# Patient Record
Sex: FEMALE | Race: BLACK OR AFRICAN AMERICAN | NOT HISPANIC OR LATINO | Employment: OTHER | URBAN - METROPOLITAN AREA
[De-identification: names, ages, dates, MRNs, and addresses within clinical notes are randomized per-mention and may not be internally consistent; named-entity substitution may affect disease eponyms.]

---

## 2017-09-28 DIAGNOSIS — Z12.31 ENCOUNTER FOR SCREENING MAMMOGRAM FOR MALIGNANT NEOPLASM OF BREAST: Primary | ICD-10-CM

## 2017-10-26 ENCOUNTER — HOSPITAL ENCOUNTER (OUTPATIENT)
Dept: RADIOLOGY | Facility: CLINIC | Age: 77
Discharge: HOME | End: 2017-10-26
Attending: OBSTETRICS & GYNECOLOGY
Payer: MEDICARE

## 2017-10-26 DIAGNOSIS — Z12.31 ENCOUNTER FOR SCREENING MAMMOGRAM FOR MALIGNANT NEOPLASM OF BREAST: ICD-10-CM

## 2017-10-26 PROCEDURE — 77067 SCR MAMMO BI INCL CAD: CPT | Mod: TC

## 2017-10-26 PROCEDURE — 77067 SCR MAMMO BI INCL CAD: CPT | Mod: 26,,, | Performed by: RADIOLOGY

## 2017-10-26 PROCEDURE — 77063 BREAST TOMOSYNTHESIS BI: CPT | Mod: 26,,, | Performed by: RADIOLOGY

## 2017-12-31 LAB — BNP SERPL-MCNC: 229 PG/ML (ref 0–100)

## 2018-06-06 ENCOUNTER — HISTORICAL (OUTPATIENT)
Dept: ADMINISTRATIVE | Facility: HOSPITAL | Age: 78
End: 2018-06-06

## 2018-09-11 ENCOUNTER — HOSPITAL ENCOUNTER (INPATIENT)
Facility: HOSPITAL | Age: 78
LOS: 13 days | Discharge: NURSING, REHAB OR LONG-TERM CARE | DRG: 871 | End: 2018-09-24
Attending: EMERGENCY MEDICINE | Admitting: INTERNAL MEDICINE
Payer: MEDICARE

## 2018-09-11 DIAGNOSIS — E03.9 ACQUIRED HYPOTHYROIDISM: ICD-10-CM

## 2018-09-11 DIAGNOSIS — R31.9 HEMATURIA, UNSPECIFIED TYPE: ICD-10-CM

## 2018-09-11 DIAGNOSIS — A41.9 SEVERE SEPSIS: ICD-10-CM

## 2018-09-11 DIAGNOSIS — N39.0 ACUTE UTI: Primary | ICD-10-CM

## 2018-09-11 DIAGNOSIS — N30.01 ACUTE CYSTITIS WITH HEMATURIA: ICD-10-CM

## 2018-09-11 DIAGNOSIS — G93.41 ENCEPHALOPATHY, METABOLIC: ICD-10-CM

## 2018-09-11 DIAGNOSIS — Z86.711 HISTORY OF PULMONARY EMBOLISM: ICD-10-CM

## 2018-09-11 DIAGNOSIS — I10 ESSENTIAL HYPERTENSION: ICD-10-CM

## 2018-09-11 DIAGNOSIS — R65.20 SEVERE SEPSIS: ICD-10-CM

## 2018-09-11 DIAGNOSIS — R00.1 BRADYCARDIA: ICD-10-CM

## 2018-09-11 DIAGNOSIS — N18.2 STAGE 2 CHRONIC KIDNEY DISEASE: ICD-10-CM

## 2018-09-11 DIAGNOSIS — R41.82 ALTERED MENTAL STATUS, UNSPECIFIED ALTERED MENTAL STATUS TYPE: ICD-10-CM

## 2018-09-11 DIAGNOSIS — I50.32 CHRONIC DIASTOLIC HEART FAILURE: ICD-10-CM

## 2018-09-11 LAB
ALBUMIN SERPL BCP-MCNC: 2.8 G/DL
ALP SERPL-CCNC: 150 U/L
ALT SERPL W/O P-5'-P-CCNC: 25 U/L
ANION GAP SERPL CALC-SCNC: 17 MMOL/L
ANISOCYTOSIS BLD QL SMEAR: ABNORMAL
AST SERPL-CCNC: 51 U/L
BACTERIA #/AREA URNS HPF: ABNORMAL /HPF
BASOPHILS # BLD AUTO: ABNORMAL K/UL
BASOPHILS NFR BLD: 0 %
BILIRUB SERPL-MCNC: 2.4 MG/DL
BILIRUB UR QL STRIP: ABNORMAL
BUN SERPL-MCNC: 43 MG/DL
CALCIUM SERPL-MCNC: 9.8 MG/DL
CHLORIDE SERPL-SCNC: 94 MMOL/L
CLARITY UR: ABNORMAL
CO2 SERPL-SCNC: 27 MMOL/L
COLOR UR: ABNORMAL
CREAT SERPL-MCNC: 1 MG/DL
DIFFERENTIAL METHOD: ABNORMAL
EOSINOPHIL # BLD AUTO: ABNORMAL K/UL
EOSINOPHIL NFR BLD: 0 %
ERYTHROCYTE [DISTWIDTH] IN BLOOD BY AUTOMATED COUNT: 23.7 %
EST. GFR  (AFRICAN AMERICAN): >60 ML/MIN/1.73 M^2
EST. GFR  (NON AFRICAN AMERICAN): 54 ML/MIN/1.73 M^2
GLUCOSE SERPL-MCNC: 87 MG/DL
GLUCOSE UR QL STRIP: NEGATIVE
HCT VFR BLD AUTO: 34.8 %
HGB BLD-MCNC: 10.1 G/DL
HGB UR QL STRIP: ABNORMAL
HYALINE CASTS #/AREA URNS LPF: 0 /LPF
KETONES UR QL STRIP: NEGATIVE
LACTATE SERPL-SCNC: 4.9 MMOL/L
LEUKOCYTE ESTERASE UR QL STRIP: ABNORMAL
LYMPHOCYTES # BLD AUTO: ABNORMAL K/UL
LYMPHOCYTES NFR BLD: 15 %
MCH RBC QN AUTO: 21.8 PG
MCHC RBC AUTO-ENTMCNC: 29.2 G/DL
MCV RBC AUTO: 75 FL
MICROSCOPIC COMMENT: ABNORMAL
MONOCYTES # BLD AUTO: ABNORMAL K/UL
MONOCYTES NFR BLD: 9 %
NEUTROPHILS NFR BLD: 73 %
NEUTS BAND NFR BLD MANUAL: 3 %
NITRITE UR QL STRIP: NEGATIVE
NRBC BLD-RTO: 0 /100 WBC
OVALOCYTES BLD QL SMEAR: ABNORMAL
PH UR STRIP: 6 [PH] (ref 5–8)
PLATELET # BLD AUTO: 407 K/UL
PLATELET BLD QL SMEAR: ABNORMAL
PMV BLD AUTO: 9.4 FL
POCT GLUCOSE: 87 MG/DL (ref 70–110)
POIKILOCYTOSIS BLD QL SMEAR: SLIGHT
POLYCHROMASIA BLD QL SMEAR: ABNORMAL
POTASSIUM SERPL-SCNC: 5 MMOL/L
PROT SERPL-MCNC: 7.8 G/DL
PROT UR QL STRIP: ABNORMAL
RBC # BLD AUTO: 4.65 M/UL
RBC #/AREA URNS HPF: >100 /HPF (ref 0–4)
SODIUM SERPL-SCNC: 138 MMOL/L
SP GR UR STRIP: 1.02 (ref 1–1.03)
SQUAMOUS #/AREA URNS HPF: 1 /HPF
TARGETS BLD QL SMEAR: ABNORMAL
URN SPEC COLLECT METH UR: ABNORMAL
UROBILINOGEN UR STRIP-ACNC: 1 EU/DL
WBC # BLD AUTO: 12.5 K/UL
WBC #/AREA URNS HPF: >100 /HPF (ref 0–5)

## 2018-09-11 PROCEDURE — 63600175 PHARM REV CODE 636 W HCPCS: Performed by: EMERGENCY MEDICINE

## 2018-09-11 PROCEDURE — 87086 URINE CULTURE/COLONY COUNT: CPT

## 2018-09-11 PROCEDURE — 85610 PROTHROMBIN TIME: CPT

## 2018-09-11 PROCEDURE — 87077 CULTURE AEROBIC IDENTIFY: CPT

## 2018-09-11 PROCEDURE — 87088 URINE BACTERIA CULTURE: CPT

## 2018-09-11 PROCEDURE — 81000 URINALYSIS NONAUTO W/SCOPE: CPT

## 2018-09-11 PROCEDURE — 12000002 HC ACUTE/MED SURGE SEMI-PRIVATE ROOM

## 2018-09-11 PROCEDURE — 93005 ELECTROCARDIOGRAM TRACING: CPT

## 2018-09-11 PROCEDURE — P9612 CATHETERIZE FOR URINE SPEC: HCPCS

## 2018-09-11 PROCEDURE — 25000003 PHARM REV CODE 250: Performed by: EMERGENCY MEDICINE

## 2018-09-11 PROCEDURE — 85730 THROMBOPLASTIN TIME PARTIAL: CPT

## 2018-09-11 PROCEDURE — 83605 ASSAY OF LACTIC ACID: CPT

## 2018-09-11 PROCEDURE — 80053 COMPREHEN METABOLIC PANEL: CPT

## 2018-09-11 PROCEDURE — 87040 BLOOD CULTURE FOR BACTERIA: CPT | Mod: 59

## 2018-09-11 PROCEDURE — 87186 SC STD MICRODIL/AGAR DIL: CPT

## 2018-09-11 PROCEDURE — 85007 BL SMEAR W/DIFF WBC COUNT: CPT

## 2018-09-11 PROCEDURE — 85027 COMPLETE CBC AUTOMATED: CPT

## 2018-09-11 PROCEDURE — 82962 GLUCOSE BLOOD TEST: CPT

## 2018-09-11 PROCEDURE — 99285 EMERGENCY DEPT VISIT HI MDM: CPT

## 2018-09-11 PROCEDURE — 36415 COLL VENOUS BLD VENIPUNCTURE: CPT

## 2018-09-11 RX ORDER — IPRATROPIUM BROMIDE AND ALBUTEROL SULFATE 2.5; .5 MG/3ML; MG/3ML
3 SOLUTION RESPIRATORY (INHALATION) EVERY 6 HOURS PRN
COMMUNITY

## 2018-09-11 RX ORDER — TRAMADOL HYDROCHLORIDE 50 MG/1
50 TABLET ORAL EVERY 6 HOURS PRN
COMMUNITY

## 2018-09-11 RX ORDER — ASPIRIN 81 MG/1
81 TABLET ORAL DAILY
COMMUNITY

## 2018-09-11 RX ORDER — LATANOPROST 50 UG/ML
2 SOLUTION/ DROPS OPHTHALMIC 2 TIMES DAILY
COMMUNITY

## 2018-09-11 RX ORDER — ACETAMINOPHEN 325 MG/1
650 TABLET ORAL EVERY 6 HOURS PRN
COMMUNITY

## 2018-09-11 RX ORDER — DIGOXIN 250 MCG
250 TABLET ORAL DAILY
Status: ON HOLD | COMMUNITY
End: 2018-09-24 | Stop reason: HOSPADM

## 2018-09-11 RX ORDER — FUROSEMIDE 20 MG/1
60 TABLET ORAL 2 TIMES DAILY
Status: ON HOLD | COMMUNITY
End: 2018-09-24 | Stop reason: SDUPTHER

## 2018-09-11 RX ORDER — LEVOTHYROXINE SODIUM 25 UG/1
25 TABLET ORAL
COMMUNITY

## 2018-09-11 RX ORDER — DOXYCYCLINE 100 MG/1
100 CAPSULE ORAL EVERY 12 HOURS
Status: ON HOLD | COMMUNITY
Start: 2018-09-06 | End: 2018-09-24 | Stop reason: HOSPADM

## 2018-09-11 RX ADMIN — SODIUM CHLORIDE 3267 ML: 0.9 INJECTION, SOLUTION INTRAVENOUS at 10:09

## 2018-09-11 RX ADMIN — GENTAMICIN SULFATE 240 MG: 40 INJECTION, SOLUTION INTRAMUSCULAR; INTRAVENOUS at 10:09

## 2018-09-12 PROBLEM — R65.20 SEVERE SEPSIS: Status: ACTIVE | Noted: 2018-09-12

## 2018-09-12 PROBLEM — H40.9 GLAUCOMA: Status: ACTIVE | Noted: 2018-09-12

## 2018-09-12 PROBLEM — E03.9 ACQUIRED HYPOTHYROIDISM: Status: ACTIVE | Noted: 2018-09-12

## 2018-09-12 PROBLEM — N30.01 ACUTE CYSTITIS WITH HEMATURIA: Status: ACTIVE | Noted: 2018-09-12

## 2018-09-12 PROBLEM — I10 ESSENTIAL HYPERTENSION: Status: ACTIVE | Noted: 2018-09-12

## 2018-09-12 PROBLEM — N18.2 STAGE 2 CHRONIC KIDNEY DISEASE: Status: ACTIVE | Noted: 2018-09-12

## 2018-09-12 PROBLEM — I50.32 CHRONIC DIASTOLIC HEART FAILURE: Status: ACTIVE | Noted: 2018-09-12

## 2018-09-12 PROBLEM — Z86.711 HISTORY OF PULMONARY EMBOLISM: Status: ACTIVE | Noted: 2018-09-12

## 2018-09-12 PROBLEM — A41.9 SEVERE SEPSIS: Status: ACTIVE | Noted: 2018-09-12

## 2018-09-12 PROBLEM — G93.41 ENCEPHALOPATHY, METABOLIC: Status: ACTIVE | Noted: 2018-09-12

## 2018-09-12 LAB
ANION GAP SERPL CALC-SCNC: 15 MMOL/L
ANISOCYTOSIS BLD QL SMEAR: SLIGHT
APTT BLDCRRT: 50.7 SEC
BASOPHILS NFR BLD: 0 %
BUN SERPL-MCNC: 43 MG/DL
CALCIUM SERPL-MCNC: 9.5 MG/DL
CHLORIDE SERPL-SCNC: 97 MMOL/L
CK MB SERPL-MCNC: 2.7 NG/ML
CK MB SERPL-MCNC: 3 NG/ML
CK MB SERPL-RTO: 6.4 %
CK MB SERPL-RTO: 6.8 %
CK SERPL-CCNC: 40 U/L
CK SERPL-CCNC: 40 U/L
CK SERPL-CCNC: 47 U/L
CK SERPL-CCNC: 47 U/L
CO2 SERPL-SCNC: 26 MMOL/L
CREAT SERPL-MCNC: 1 MG/DL
DIFFERENTIAL METHOD: ABNORMAL
DIGOXIN SERPL-MCNC: 1.8 NG/ML
EOSINOPHIL NFR BLD: 0 %
ERYTHROCYTE [DISTWIDTH] IN BLOOD BY AUTOMATED COUNT: 23.4 %
EST. GFR  (AFRICAN AMERICAN): >60 ML/MIN/1.73 M^2
EST. GFR  (NON AFRICAN AMERICAN): 54 ML/MIN/1.73 M^2
GLUCOSE SERPL-MCNC: 76 MG/DL
HCT VFR BLD AUTO: 34.7 %
HGB BLD-MCNC: 10 G/DL
INR PPP: 2.3
INR PPP: 2.4
LACTATE SERPL-SCNC: 3.9 MMOL/L
LACTATE SERPL-SCNC: 3.9 MMOL/L
LACTATE SERPL-SCNC: 4.2 MMOL/L
LACTATE SERPL-SCNC: 4.6 MMOL/L
LYMPHOCYTES NFR BLD: 7 %
MAGNESIUM SERPL-MCNC: 1.6 MG/DL
MCH RBC QN AUTO: 21.6 PG
MCHC RBC AUTO-ENTMCNC: 28.8 G/DL
MCV RBC AUTO: 75 FL
MONOCYTES NFR BLD: 8 %
NEUTROPHILS NFR BLD: 85 %
NRBC BLD-RTO: 1 /100 WBC
PHOSPHATE SERPL-MCNC: 3.8 MG/DL
PLATELET # BLD AUTO: 327 K/UL
PLATELET BLD QL SMEAR: ABNORMAL
PMV BLD AUTO: 9.4 FL
POCT GLUCOSE: 100 MG/DL (ref 70–110)
POCT GLUCOSE: 132 MG/DL (ref 70–110)
POCT GLUCOSE: 135 MG/DL (ref 70–110)
POCT GLUCOSE: 57 MG/DL (ref 70–110)
POCT GLUCOSE: 99 MG/DL (ref 70–110)
POLYCHROMASIA BLD QL SMEAR: ABNORMAL
POTASSIUM SERPL-SCNC: 4.8 MMOL/L
PROTHROMBIN TIME: 22.8 SEC
PROTHROMBIN TIME: 24.4 SEC
RBC # BLD AUTO: 4.62 M/UL
SODIUM SERPL-SCNC: 138 MMOL/L
TARGETS BLD QL SMEAR: ABNORMAL
TROPONIN I SERPL DL<=0.01 NG/ML-MCNC: 0.01 NG/ML
TROPONIN I SERPL DL<=0.01 NG/ML-MCNC: 0.01 NG/ML
WBC # BLD AUTO: 12.5 K/UL

## 2018-09-12 PROCEDURE — 99233 SBSQ HOSP IP/OBS HIGH 50: CPT | Mod: ,,, | Performed by: INTERNAL MEDICINE

## 2018-09-12 PROCEDURE — 88112 CYTOPATH CELL ENHANCE TECH: CPT | Mod: 26,,, | Performed by: PATHOLOGY

## 2018-09-12 PROCEDURE — 82550 ASSAY OF CK (CPK): CPT

## 2018-09-12 PROCEDURE — 88112 CYTOPATH CELL ENHANCE TECH: CPT | Performed by: PATHOLOGY

## 2018-09-12 PROCEDURE — 83735 ASSAY OF MAGNESIUM: CPT

## 2018-09-12 PROCEDURE — 84100 ASSAY OF PHOSPHORUS: CPT

## 2018-09-12 PROCEDURE — 83605 ASSAY OF LACTIC ACID: CPT | Mod: 91

## 2018-09-12 PROCEDURE — 93005 ELECTROCARDIOGRAM TRACING: CPT

## 2018-09-12 PROCEDURE — 84484 ASSAY OF TROPONIN QUANT: CPT

## 2018-09-12 PROCEDURE — 94761 N-INVAS EAR/PLS OXIMETRY MLT: CPT

## 2018-09-12 PROCEDURE — 85007 BL SMEAR W/DIFF WBC COUNT: CPT

## 2018-09-12 PROCEDURE — 83605 ASSAY OF LACTIC ACID: CPT

## 2018-09-12 PROCEDURE — 85027 COMPLETE CBC AUTOMATED: CPT

## 2018-09-12 PROCEDURE — 63600175 PHARM REV CODE 636 W HCPCS: Performed by: NURSE PRACTITIONER

## 2018-09-12 PROCEDURE — 80048 BASIC METABOLIC PNL TOTAL CA: CPT

## 2018-09-12 PROCEDURE — 51700 IRRIGATION OF BLADDER: CPT | Mod: ,,, | Performed by: UROLOGY

## 2018-09-12 PROCEDURE — 80162 ASSAY OF DIGOXIN TOTAL: CPT

## 2018-09-12 PROCEDURE — 25000003 PHARM REV CODE 250: Performed by: NURSE PRACTITIONER

## 2018-09-12 PROCEDURE — 85610 PROTHROMBIN TIME: CPT

## 2018-09-12 PROCEDURE — 27000221 HC OXYGEN, UP TO 24 HOURS

## 2018-09-12 PROCEDURE — 25000003 PHARM REV CODE 250: Performed by: HOSPITALIST

## 2018-09-12 PROCEDURE — 36415 COLL VENOUS BLD VENIPUNCTURE: CPT

## 2018-09-12 PROCEDURE — 82553 CREATINE MB FRACTION: CPT

## 2018-09-12 PROCEDURE — 25500020 PHARM REV CODE 255

## 2018-09-12 PROCEDURE — 25000003 PHARM REV CODE 250: Performed by: INTERNAL MEDICINE

## 2018-09-12 PROCEDURE — 20000000 HC ICU ROOM

## 2018-09-12 PROCEDURE — 99223 1ST HOSP IP/OBS HIGH 75: CPT | Mod: 25,,, | Performed by: UROLOGY

## 2018-09-12 RX ORDER — DIGOXIN 125 MCG
250 TABLET ORAL DAILY
Status: DISCONTINUED | OUTPATIENT
Start: 2018-09-12 | End: 2018-09-13

## 2018-09-12 RX ORDER — SODIUM CHLORIDE 9 MG/ML
INJECTION, SOLUTION INTRAVENOUS CONTINUOUS
Status: DISCONTINUED | OUTPATIENT
Start: 2018-09-12 | End: 2018-09-15

## 2018-09-12 RX ORDER — LATANOPROST 50 UG/ML
1 SOLUTION/ DROPS OPHTHALMIC NIGHTLY
Status: DISCONTINUED | OUTPATIENT
Start: 2018-09-12 | End: 2018-09-24 | Stop reason: HOSPADM

## 2018-09-12 RX ORDER — GLUCAGON 1 MG
1 KIT INJECTION
Status: DISCONTINUED | OUTPATIENT
Start: 2018-09-12 | End: 2018-09-24 | Stop reason: HOSPADM

## 2018-09-12 RX ORDER — PANTOPRAZOLE SODIUM 40 MG/1
40 TABLET, DELAYED RELEASE ORAL DAILY
Status: DISCONTINUED | OUTPATIENT
Start: 2018-09-12 | End: 2018-09-24 | Stop reason: HOSPADM

## 2018-09-12 RX ORDER — ACETAMINOPHEN 325 MG/1
650 TABLET ORAL EVERY 4 HOURS PRN
Status: DISCONTINUED | OUTPATIENT
Start: 2018-09-12 | End: 2018-09-24 | Stop reason: HOSPADM

## 2018-09-12 RX ORDER — IBUPROFEN 200 MG
16 TABLET ORAL
Status: DISCONTINUED | OUTPATIENT
Start: 2018-09-12 | End: 2018-09-24 | Stop reason: HOSPADM

## 2018-09-12 RX ORDER — SODIUM CHLORIDE 9 MG/ML
INJECTION, SOLUTION INTRAVENOUS
Status: DISPENSED
Start: 2018-09-12 | End: 2018-09-12

## 2018-09-12 RX ORDER — CEFEPIME HYDROCHLORIDE 1 G/50ML
1 INJECTION, SOLUTION INTRAVENOUS
Status: DISCONTINUED | OUTPATIENT
Start: 2018-09-12 | End: 2018-09-22

## 2018-09-12 RX ORDER — LEVOTHYROXINE SODIUM 25 UG/1
25 TABLET ORAL
Status: DISCONTINUED | OUTPATIENT
Start: 2018-09-12 | End: 2018-09-24 | Stop reason: HOSPADM

## 2018-09-12 RX ORDER — METOPROLOL SUCCINATE 50 MG/1
100 TABLET, EXTENDED RELEASE ORAL DAILY
Status: DISCONTINUED | OUTPATIENT
Start: 2018-09-12 | End: 2018-09-12

## 2018-09-12 RX ORDER — TIMOLOL MALEATE 5 MG/ML
1 SOLUTION/ DROPS OPHTHALMIC DAILY
Status: DISCONTINUED | OUTPATIENT
Start: 2018-09-12 | End: 2018-09-24 | Stop reason: HOSPADM

## 2018-09-12 RX ORDER — SODIUM CHLORIDE 0.9 % (FLUSH) 0.9 %
5 SYRINGE (ML) INJECTION
Status: DISCONTINUED | OUTPATIENT
Start: 2018-09-12 | End: 2018-09-24 | Stop reason: HOSPADM

## 2018-09-12 RX ORDER — TRAMADOL HYDROCHLORIDE 50 MG/1
50 TABLET ORAL EVERY 6 HOURS PRN
Status: DISCONTINUED | OUTPATIENT
Start: 2018-09-12 | End: 2018-09-24 | Stop reason: HOSPADM

## 2018-09-12 RX ORDER — IBUPROFEN 200 MG
24 TABLET ORAL
Status: DISCONTINUED | OUTPATIENT
Start: 2018-09-12 | End: 2018-09-24 | Stop reason: HOSPADM

## 2018-09-12 RX ADMIN — CEFEPIME HYDROCHLORIDE 1 G: 1 INJECTION, POWDER, FOR SOLUTION INTRAMUSCULAR; INTRAVENOUS at 03:09

## 2018-09-12 RX ADMIN — TIMOLOL MALEATE 1 DROP: 5 SOLUTION OPHTHALMIC at 10:09

## 2018-09-12 RX ADMIN — PANTOPRAZOLE SODIUM 40 MG: 40 TABLET, DELAYED RELEASE ORAL at 10:09

## 2018-09-12 RX ADMIN — LATANOPROST 1 DROP: 50 SOLUTION OPHTHALMIC at 08:09

## 2018-09-12 RX ADMIN — DEXTROSE MONOHYDRATE 12.5 G: 25 INJECTION, SOLUTION INTRAVENOUS at 08:09

## 2018-09-12 RX ADMIN — IOHEXOL 100 ML: 350 INJECTION, SOLUTION INTRAVENOUS at 09:09

## 2018-09-12 RX ADMIN — SODIUM CHLORIDE: 0.9 INJECTION, SOLUTION INTRAVENOUS at 08:09

## 2018-09-12 NOTE — NURSING
Pt received to unit via ED stretcher. Mckeon catheter intact with red bloody urine. Pt noted to have multiple attempted PIV placement with blood oozing from sites. Bruising all over body. Bradycardic in mid 40s-60s. Pt alert with mumbled speech. Unable to complete most of admit assessment questions at this time.

## 2018-09-12 NOTE — PROGRESS NOTES
Progress Note  Hospital Medicine  Patient Name:Marjroie Macario  MRN:  [id number]  Patient Class: IP- Inpatient  Admit Date: 9/11/2018  Length of Stay: 0 days  Expected Discharge Date:   Attending Physician: Erin Silver MD  Primary Care Provider:  Sotero Le MD    SUBJECTIVE:     Principal Problem: Severe sepsis  Initial history of present illness: Pt was sent from [facility] for hematuria and altered mental status. Pt was difficult to arouse, now after IV fluids, awake and talking unintelligible. Unable to obtain history and ROS from pt due to encephalopathy. Records did not offer any up to date information. In July, pt was treated for septic shock due to UTI at [facility]. Pt required pressors along with mechanical ventilation at that time.     PMH/PSH/SH/FH/Meds: reviewed.    Symptoms/Review of Systems: In ICU, patient is with intermittent confusion, low blood pressure, hematuria persisting. No shortness of breath, cough, chest pain or headache, fever or abdominal pain.   Diet:  Adequate intake.    Activity level: Up with assist   Pain:  Patient reports no pain.       OBJECTIVE:   Vital Signs (Most Recent):      Temp: 97.6 °F (36.4 °C) (09/12/18 0154)  Pulse: 60 (09/12/18 0345)  Resp: 17 (09/12/18 0345)  BP: 107/62 (09/12/18 0330)  SpO2: (unable to  reading) (09/12/18 0154)       Vital Signs Range (Last 24H):  Temp:  [96.8 °F (36 °C)-97.6 °F (36.4 °C)]   Pulse:  [50-63]   Resp:  [13-65]   BP: (102-127)/(51-65)   SpO2:  [67 %]     Weight: 118.9 kg (262 lb 2 oz)  Body mass index is 43.62 kg/m².    Intake/Output Summary (Last 24 hours) at 9/12/2018 0830  Last data filed at 9/12/2018 0505  Gross per 24 hour   Intake --   Output 150 ml   Net -150 ml     Physical Examination:  Constitutional: She is oriented to person, place, and time. No distress.   HENT:   Head: Normocephalic and atraumatic.   Eyes: Conjunctivae are normal. Right eye exhibits no discharge. Left eye exhibits no discharge. No scleral icterus.    Neck: Normal range of motion. Neck supple. No JVD present. No thyromegaly present.   Cardiovascular: Normal rate and regular rhythm. Exam reveals no gallop and no friction rub.   Murmur heard.  Pulmonary/Chest: Effort normal and breath sounds normal. No respiratory distress. She has no wheezes. She has no rales. She exhibits no tenderness.   Abdominal: Soft. Bowel sounds are normal. She exhibits no distension. There is no tenderness. There is no rebound and no guarding.   Musculoskeletal: She exhibits no edema or tenderness.   Lymphadenopathy:     She has no cervical adenopathy.   Neurological: She is alert and oriented to person, place, and time. No cranial nerve deficit.   Skin: Skin is dry. Capillary refill takes less than 2 seconds. She is not diaphoretic.   Cool to touch  Pretibial bandage removed-small ulcer with a clean base noted.     Psychiatric: She has a normal mood and affect. Her behavior is normal. Judgment and thought content normal.    CBC:  Recent Labs   Lab  09/11/18 2152 09/12/18 0622   WBC  12.50  12.50   RBC  4.65  4.62   HGB  10.1*  10.0*   HCT  34.8*  34.7*   PLT  407*  327   MCV  75*  75*   MCH  21.8*  21.6*   MCHC  29.2*  28.8*   BMP  Recent Labs   Lab  09/11/18 2151 09/12/18 0622   GLU  87  76   NA  138  138   K  5.0  4.8   CL  94*  97   CO2  27  26   BUN  43*  43*   CREATININE  1.0  1.0   CALCIUM  9.8  9.5   MG   --   1.6      Diagnostic Results:  Microbiology Results (last 7 days)     Procedure Component Value Units Date/Time    Blood culture x two cultures. Draw prior to antibiotics. [[id number]] Collected:  09/11/18 2151    Order Status:  Completed Specimen:  Blood Updated:  09/12/18 0715     Blood Culture, Routine No Growth to date    Narrative:       Aerobic and anaerobic    Blood culture x two cultures. Draw prior to antibiotics. [[id number]] Collected:  09/11/18 2151    Order Status:  Completed Specimen:  Blood Updated:  09/12/18 0715     Blood Culture, Routine No Growth  to date    Narrative:       Aerobic and anaerobic    Urine culture [[id number]] Collected:  09/11/18 2200    Order Status:  No result Specimen:  Urine Updated:  09/11/18 2228           Assessment/Plan:     * Severe sepsis     Secondary to UTI.  Associated with lactic acidosis and leukocytosis  Heart rate blunted by daily BB and digoxin. Continue IVF hydration and trend LFTs  Check pro calcitonin   Concerns about DIC, pt bleeding from urine and all venipuncture sites  Continue Cefepime.  Check serial cardiac enzymes.       Encephalopathy, metabolic     Acute  Secondary to UTI  Mild improvement after IV fluids  Continue to monitor closely        Stage 2 chronic kidney disease     Creatinine at baseline  Will trend daily        Glaucoma     Chronic, stable  Continue BB eye gtts       Acquired hypothyroidism     Chronic  Continue Synthroid       Chronic diastolic heart failure     No evidence of exacerbation   Hold diuretics for now        Essential hypertension     Currently on low side of normal   Hold anti-hypertensive's   Monitor and treat accordingly        Acute cystitis with hematuria     Empiric coverage with cefepime  Consult urology  Stop Apixaban         History of pulmonary embolism     Stop Apixaban now due to abnormal bleeding      DVT prophylaxis: Use SCD and TEDs. Apixaban is on hold due to bleeding/hematuria.    Erin Silver MD  Department of Hospital Medicine   [facility]

## 2018-09-12 NOTE — SUBJECTIVE & OBJECTIVE
Past Medical History:   Diagnosis Date    Chronic diastolic heart failure 9/12/2018    Hypertension     Pulmonary embolism     Sleep apnea     on CPAP    Stage 2 chronic kidney disease 9/12/2018       Past Surgical History:   Procedure Laterality Date    APPENDECTOMY      CATARACT EXTRACTION      HYSTERECTOMY      OOPHORECTOMY      SHOULDER OPEN ROTATOR CUFF REPAIR Left [place] 2015    TOTAL KNEE ARTHROPLASTY Bilateral 2012, 2013       Review of patient's allergies indicates:   Allergen Reactions    Penicillins Rash     swelling       No current facility-administered medications on file prior to encounter.      Current Outpatient Medications on File Prior to Encounter   Medication Sig    acetaminophen (TYLENOL) 325 MG tablet Take 650 mg by mouth every 6 (six) hours as needed for Pain.    albuterol-ipratropium (DUO-NEB) 2.5 mg-0.5 mg/3 mL nebulizer solution Take 3 mLs by nebulization every 6 (six) hours as needed for Wheezing or Shortness of Breath. Rescue    apixaban 5 mg Tab Take 5 mg by mouth 2 (two) times daily.    aspirin (ECOTRIN) 81 MG EC tablet Take 81 mg by mouth once daily.    digoxin (LANOXIN) 250 mcg tablet Take 250 mcg by mouth once daily.    doxycycline (VIBRAMYCIN) 100 MG Cap Take 100 mg by mouth every 12 (twelve) hours.    furosemide (LASIX) 20 MG tablet Take 60 mg by mouth 2 (two) times daily.    Lactobacillus acidophilus (ACIDOPHILUS ORAL) Take 1 capsule by mouth 2 (two) times daily.    latanoprost 0.005 % ophthalmic solution Place 2 drops into both eyes 2 (two) times daily.    levothyroxine (SYNTHROID) 25 MCG tablet Take 25 mcg by mouth before breakfast.    metoprolol succinate (TOPROL-XL) 100 MG 24 hr tablet Take 100 mg by mouth once daily.     nut.tx.imp.renal fxn,lac-free (PRO-STAT RC ORAL) Take 45 mLs by mouth 2 (two) times daily.    timolol maleate 0.5% (TIMOPTIC) 0.5 % Drop Place 2 drops into both eyes 2 (two) times daily.     traMADol (ULTRAM) 50 mg tablet Take 50 mg  by mouth every 6 (six) hours as needed for Pain.     Family History     Problem Relation (Age of Onset)    Breast cancer Sister    Cancer Sister    Diabetes Brother    Heart disease Mother        Tobacco Use    Smoking status: Never Smoker   Substance and Sexual Activity    Alcohol use: No     Alcohol/week: 0.0 oz    Drug use: No    Sexual activity: No     Review of Systems   Unable to perform ROS: Acuity of condition     Objective:     Vital Signs (Most Recent):  Temp: 97.6 °F (36.4 °C) (09/12/18 0154)  Pulse: 60 (09/12/18 0345)  Resp: 17 (09/12/18 0345)  BP: 107/62 (09/12/18 0330)  SpO2: (unable to  reading) (09/12/18 0154) Vital Signs (24h Range):  Temp:  [96.8 °F (36 °C)-97.6 °F (36.4 °C)] 97.6 °F (36.4 °C)  Pulse:  [50-63] 60  Resp:  [13-65] 17  SpO2:  [67 %] 67 %  BP: (102-127)/(51-65) 107/62     Weight: 118.9 kg (262 lb 2 oz)  Body mass index is 43.62 kg/m².    Physical Exam   Constitutional: She is oriented to person, place, and time. No distress.   HENT:   Head: Normocephalic and atraumatic.   Eyes: Conjunctivae are normal. Right eye exhibits no discharge. Left eye exhibits no discharge. No scleral icterus.   Neck: Normal range of motion. Neck supple. No JVD present. No thyromegaly present.   Cardiovascular: Normal rate and regular rhythm. Exam reveals no gallop and no friction rub.   Murmur heard.  Pulmonary/Chest: Effort normal and breath sounds normal. No respiratory distress. She has no wheezes. She has no rales. She exhibits no tenderness.   Abdominal: Soft. Bowel sounds are normal. She exhibits no distension. There is no tenderness. There is no rebound and no guarding.   Musculoskeletal: She exhibits no edema or tenderness.   Lymphadenopathy:     She has no cervical adenopathy.   Neurological: She is alert and oriented to person, place, and time. No cranial nerve deficit.   Skin: Skin is dry. Capillary refill takes less than 2 seconds. She is not diaphoretic.   Cool to touch  Pretibial  bandage removed-small ulcer with a clean base noted.     Psychiatric: She has a normal mood and affect. Her behavior is normal. Judgment and thought content normal.           Significant Labs:   CBC:   Recent Labs   Lab  09/11/18 2152   WBC  12.50   HGB  10.1*   HCT  34.8*   PLT  407*     CMP:   Recent Labs   Lab  09/11/18 2151   NA  138   K  5.0   CL  94*   CO2  27   GLU  87   BUN  43*   CREATININE  1.0   CALCIUM  9.8   PROT  7.8   ALBUMIN  2.8*   BILITOT  2.4*   ALKPHOS  150*   AST  51*   ALT  25   ANIONGAP  17*   EGFRNONAA  54*     Lactic Acid:   Recent Labs   Lab  09/11/18 2152 09/12/18   0138   LACTATE  4.9*  3.9*       Significant Imaging: I have reviewed and interpreted all pertinent imaging results/findings within the past 24 hours.

## 2018-09-12 NOTE — PLAN OF CARE
EKG done, labs drawn, Digoxin was held , Dr Regalado was called with the EKG results, 1055 it showed marked sinus sarah and at 1056 it showed A-fib, orders for cardiac enzymes/troponin levels , SCD's placed on pt. Tolerating sips of water, no diet ordered presently.

## 2018-09-12 NOTE — PLAN OF CARE
Traveled to CT on monitor, HR has been in the 60's, I was getting her AM meds ready and noticed her HR dropping into the mid 20's and then back to the 40-60's, appears to be A-fib presently, Dr Silver on the unit and came in to the room and at that time the HR was in the 40's, BP fluctuating 86//52. Pt is awake, confused, resides at [facility], orders received.

## 2018-09-12 NOTE — H&P
[facility] Hospital Medicine  History & Physical    Patient Name: Marjorie Macario  MRN: [id number]  Admission Date: 9/11/2018  Attending Physician: Erin Silver MD   Primary Care Provider: Sotero Le MD         Patient information was obtained from nursing home and ER records.     Subjective:     Principal Problem:Severe sepsis    Chief Complaint:   Chief Complaint   Patient presents with    Hematuria    Altered Mental Status        HPI: Pt was sent from [facility] for hematuria and altered mental status. Pt was difficult to arouse, now after IV fluids, awake and talking unintelligible. Unable to obtain history and ROS from pt due to encephalopathy. Records did not offer any up to date information. In July, pt was treated for septic shock due to UTI at [facility]. Pt required pressors along with mechanical ventilation at that time.     Past Medical History:   Diagnosis Date    Chronic diastolic heart failure 9/12/2018    Hypertension     Pulmonary embolism     Sleep apnea     on CPAP    Stage 2 chronic kidney disease 9/12/2018       Past Surgical History:   Procedure Laterality Date    APPENDECTOMY      CATARACT EXTRACTION      HYSTERECTOMY      OOPHORECTOMY      SHOULDER OPEN ROTATOR CUFF REPAIR Left [place] 2015    TOTAL KNEE ARTHROPLASTY Bilateral 2012, 2013       Review of patient's allergies indicates:   Allergen Reactions    Penicillins Rash     swelling       No current facility-administered medications on file prior to encounter.      Current Outpatient Medications on File Prior to Encounter   Medication Sig    acetaminophen (TYLENOL) 325 MG tablet Take 650 mg by mouth every 6 (six) hours as needed for Pain.    albuterol-ipratropium (DUO-NEB) 2.5 mg-0.5 mg/3 mL nebulizer solution Take 3 mLs by nebulization every 6 (six) hours as needed for Wheezing or Shortness of Breath. Rescue    apixaban 5 mg Tab Take 5 mg by mouth 2 (two) times daily.    aspirin (ECOTRIN) 81 MG EC  tablet Take 81 mg by mouth once daily.    digoxin (LANOXIN) 250 mcg tablet Take 250 mcg by mouth once daily.    doxycycline (VIBRAMYCIN) 100 MG Cap Take 100 mg by mouth every 12 (twelve) hours.    furosemide (LASIX) 20 MG tablet Take 60 mg by mouth 2 (two) times daily.    Lactobacillus acidophilus (ACIDOPHILUS ORAL) Take 1 capsule by mouth 2 (two) times daily.    latanoprost 0.005 % ophthalmic solution Place 2 drops into both eyes 2 (two) times daily.    levothyroxine (SYNTHROID) 25 MCG tablet Take 25 mcg by mouth before breakfast.    metoprolol succinate (TOPROL-XL) 100 MG 24 hr tablet Take 100 mg by mouth once daily.     nut.tx.imp.renal fxn,lac-free (PRO-STAT RC ORAL) Take 45 mLs by mouth 2 (two) times daily.    timolol maleate 0.5% (TIMOPTIC) 0.5 % Drop Place 2 drops into both eyes 2 (two) times daily.     traMADol (ULTRAM) 50 mg tablet Take 50 mg by mouth every 6 (six) hours as needed for Pain.     Family History     Problem Relation (Age of Onset)    Breast cancer Sister    Cancer Sister    Diabetes Brother    Heart disease Mother        Tobacco Use    Smoking status: Never Smoker   Substance and Sexual Activity    Alcohol use: No     Alcohol/week: 0.0 oz    Drug use: No    Sexual activity: No     Review of Systems   Unable to perform ROS: Acuity of condition     Objective:     Vital Signs (Most Recent):  Temp: 97.6 °F (36.4 °C) (09/12/18 0154)  Pulse: 60 (09/12/18 0345)  Resp: 17 (09/12/18 0345)  BP: 107/62 (09/12/18 0330)  SpO2: (unable to  reading) (09/12/18 0154) Vital Signs (24h Range):  Temp:  [96.8 °F (36 °C)-97.6 °F (36.4 °C)] 97.6 °F (36.4 °C)  Pulse:  [50-63] 60  Resp:  [13-65] 17  SpO2:  [67 %] 67 %  BP: (102-127)/(51-65) 107/62     Weight: 118.9 kg (262 lb 2 oz)  Body mass index is 43.62 kg/m².    Physical Exam   Constitutional: She is oriented to person, place, and time. No distress.   HENT:   Head: Normocephalic and atraumatic.   Eyes: Conjunctivae are normal. Right eye  exhibits no discharge. Left eye exhibits no discharge. No scleral icterus.   Neck: Normal range of motion. Neck supple. No JVD present. No thyromegaly present.   Cardiovascular: Normal rate and regular rhythm. Exam reveals no gallop and no friction rub.   Murmur heard.  Pulmonary/Chest: Effort normal and breath sounds normal. No respiratory distress. She has no wheezes. She has no rales. She exhibits no tenderness.   Abdominal: Soft. Bowel sounds are normal. She exhibits no distension. There is no tenderness. There is no rebound and no guarding.   Musculoskeletal: She exhibits no edema or tenderness.   Lymphadenopathy:     She has no cervical adenopathy.   Neurological: She is alert and oriented to person, place, and time. No cranial nerve deficit.   Skin: Skin is dry. Capillary refill takes less than 2 seconds. She is not diaphoretic.   Cool to touch  Pretibial bandage removed-small ulcer with a clean base noted.     Psychiatric: She has a normal mood and affect. Her behavior is normal. Judgment and thought content normal.           Significant Labs:   CBC:   Recent Labs   Lab  09/11/18 2152   WBC  12.50   HGB  10.1*   HCT  34.8*   PLT  407*     CMP:   Recent Labs   Lab  09/11/18 2151   NA  138   K  5.0   CL  94*   CO2  27   GLU  87   BUN  43*   CREATININE  1.0   CALCIUM  9.8   PROT  7.8   ALBUMIN  2.8*   BILITOT  2.4*   ALKPHOS  150*   AST  51*   ALT  25   ANIONGAP  17*   EGFRNONAA  54*     Lactic Acid:   Recent Labs   Lab  09/11/18 2152 09/12/18   0138   LACTATE  4.9*  3.9*       Significant Imaging: I have reviewed and interpreted all pertinent imaging results/findings within the past 24 hours.    Assessment/Plan:     * Severe sepsis    Secondary to UTI  Associated with lactic acidosis and leukocytosis  Heart rate blunted by daily BB and digoxin   Received IV fluid bolus in ER  Check lactic acid q 4 hours  Check pro calcitonin   Concerns about DIC, pt bleeding from urine and all venipuncture  sites  Empiric coverage with Cefepime          Encephalopathy, metabolic    Acute  Secondary to UTI  Mild improvement after IV fluids  Continue to monitor closely           Stage 2 chronic kidney disease    Creatinine at baseline  Will trend daily           Glaucoma    Chronic, stable  Continue BB eye gtts          Acquired hypothyroidism    Chronic  Continue Synthroid           Chronic diastolic heart failure    No evidence of exacerbation   Hold diuretics for now           Essential hypertension    Currently on low side of normal   Hold anti-hypertensive's   Monitor and treat accordingly           Acute cystitis with hematuria    Empiric coverage with cefepime  Consult urology  Stop Apixaban            History of pulmonary embolism    Stop Apixaban now due to abnormal bleeding             VTE Risk Mitigation (From admission, onward)    None        Critical care time spent on the evaluation and treatment of severe organ dysfunction, review of pertinent labs and imaging studies, discussions with consulting providers and discussions with patient/family: 75 minutes.     Jaylene Nuñez NP  Department of Hospital Medicine   [facility]

## 2018-09-12 NOTE — PLAN OF CARE
This note also relates to the following rows which could not be included:  SpO2 - Cannot attach notes to unvalidated device data  Pulse - Cannot attach notes to unvalidated device data  Resp - Cannot attach notes to unvalidated device data       09/12/18 0832   Patient Assessment/Suction   Level of Consciousness (AVPU) alert   All Lung Fields Breath Sounds coarse   PRE-TX-[id number]-[id number]   O2 Device (Oxygen Therapy) nasal cannula   $ Is the patient on Low Flow Oxygen? Yes   Oxygen Concentration (%) 3   Oxygen Analyzed Concentration (%) 32 %   Pulse Oximetry Type Intermittent   Ready to Wean/Extubation Screen   FIO2<=50 (chart decimal) 0.03

## 2018-09-12 NOTE — ED PROVIDER NOTES
"Encounter Date: 9/11/2018    SCRIBE #1 NOTE: I, Miriam Fisher, am scribing for, and in the presence of, Dr. Timothy Rodríguez.       History     Chief Complaint   Patient presents with    Hematuria    Altered Mental Status       Time seen by provider: 9:00 PM on 09/11/2018    Marjorie Macario is a 78 y.o. female with PMHx of HTN, PE, and sleep apnea who presents to the ED via EMS from [facility] with complaints of hematuria and mild confusion. Patient reports "being confused and doesn't know what's going on". Patient does not have a catheter in place. Nursing staff report seeing blood in urine. She denies pain with urination, other abnormal urinary symptoms, and abdominal pain. Patient has no other medical concerns or complaints at this moment. Patient denies any pain. She denies having a history of UTI's. SHx includes hysterectomy, appendectomy, and oophorectomy. Penicillin allergy noted.       The history is provided by the patient and the nursing home.     Review of patient's allergies indicates:   Allergen Reactions    Penicillins Rash     swelling     Past Medical History:   Diagnosis Date    Hypertension     Pulmonary embolism     Sleep apnea     on CPAP     Past Surgical History:   Procedure Laterality Date    APPENDECTOMY      CATARACT EXTRACTION      HYSTERECTOMY      OOPHORECTOMY      SHOULDER OPEN ROTATOR CUFF REPAIR Left [place] 2015    TOTAL KNEE ARTHROPLASTY Bilateral 2012, 2013     Family History   Problem Relation Age of Onset    Heart disease Mother     Cancer Sister     Breast cancer Sister     Diabetes Brother      Social History     Tobacco Use    Smoking status: Never Smoker   Substance Use Topics    Alcohol use: No     Alcohol/week: 0.0 oz    Drug use: No     Review of Systems   Constitutional: Negative for chills and fever.   HENT: Negative for congestion, rhinorrhea and sore throat.    Respiratory: Negative for cough and shortness of breath.    Cardiovascular: Negative for chest " pain and palpitations.   Gastrointestinal: Negative for abdominal pain, diarrhea, nausea and vomiting.   Genitourinary: Positive for hematuria. Negative for decreased urine volume, difficulty urinating, dysuria, flank pain, frequency and urgency.   Musculoskeletal: Negative for gait problem, myalgias and neck pain.   Skin: Negative for rash.   Neurological: Negative for dizziness, weakness, light-headedness, numbness and headaches.   Hematological: Does not bruise/bleed easily.   Psychiatric/Behavioral: Positive for confusion.       Physical Exam     Initial Vitals [09/11/18 2047]   BP Pulse Resp Temp SpO2   (!) 106/55 (!) 53 16 96.8 °F (36 °C) --      MAP       --         Physical Exam    Nursing note and vitals reviewed.  Constitutional: She appears well-developed and well-nourished. She is not diaphoretic. She is Obese .   Morbidly obese.    HENT:   Head: Normocephalic and atraumatic.   Eyes: Conjunctivae and EOM are normal.   Neck: Normal range of motion. Neck supple. No thyroid mass present.   Cardiovascular: Regular rhythm and normal heart sounds. Bradycardia present.  Exam reveals no gallop and no friction rub.    No murmur heard.  Pulmonary/Chest: Breath sounds normal. She has no wheezes. She has no rhonchi. She has no rales.   Abdominal: Soft. Normal appearance and bowel sounds are normal. She exhibits no distension and no mass. There is no tenderness. There is no rebound and no guarding.   Musculoskeletal: Normal range of motion. She exhibits edema. She exhibits no tenderness.   Chronic lymphedema of BLE.    Neurological: She is alert and oriented to person, place, and time. She has normal strength. No sensory deficit.   Skin: Skin is warm and dry. Capillary refill takes less than 2 seconds. Lesion noted. No rash noted. No erythema.   Bandaged area over lesion on RLE.    Psychiatric: She has a normal mood and affect. Her speech is normal. Cognition and memory are normal.         ED Course    Procedures  Labs Reviewed   CULTURE, BLOOD   CULTURE, BLOOD   CBC W/ AUTO DIFFERENTIAL   COMPREHENSIVE METABOLIC PANEL   LACTIC ACID, PLASMA   URINALYSIS, REFLEX TO URINE CULTURE   LACTIC ACID, PLASMA   POCT GLUCOSE     EKG Readings: (Independently Interpreted)   Rhythm: Sinus Bradycardia. Heart Rate: 55 BPM. Axis: Indeterminate.   Sinus bradycardia with sinus arrhythmia with a 1st degree AV block with fusion complexes. Normal intervals. Inferior infarct, age undetermined. Possible right ventricular hypertrophy. ST & T wave abnormality, consider anterior ischemia.        Imaging Results    None          Medical Decision Making:   History:   Old Medical Records: I decided to obtain old medical records.  Clinical Tests:   Lab Tests: Ordered and Reviewed  Medical Tests: Reviewed and Ordered  ED Management:  This patient was interviewed and examined emergently.  She is alert but does not answer all questions appropriately and appears to be experiencing encephalopathy that I would associated with an acute urinary tract infection.  The patient's initial lactic acid is elevated, higher than 4.5.  White blood cell count is higher than 12,000 and I suspect that the patient's beta-blocker therapy may be masking underlying tachycardia.  The patient will be treated with 30 cc/kg fluid bolusing for severe sepsis.  Patient was initiated on broad-spectrum antibiotics targeted towards a urinary source.  She does warrant admission to the unit for close monitoring.  Case was discussed with Mrs. Nuñez who agreed to admit the patient.  She was transferred to the unit in guarded condition.            Scribe Attestation:   Scribe #1: I performed the above scribed service and the documentation accurately describes the services I performed. I attest to the accuracy of the note.    Attending Attestation:         Attending Critical Care:   Critical Care Times:   Direct Patient Care (initial evaluation, reassessments, and time considering  the case)................................................................10 minutes.   Additional History from reviewing old medical records or taking additional history from the family, EMS, PCP, etc.......................5 minutes.   Ordering, Reviewing, and Interpreting Diagnostic Studies...............................................................................................................5 minutes.   Documentation..................................................................................................................................................................................5 minutes.   Consultation with other Physicians. .................................................................................................................................................5 minutes.   Consultation with the patient's family directly relating to the patient's condition, care, and DNR status (when patient unable)......5 minutes.   Other..................................................................................................................................................................................................5 minutes.   ==============================================================  · Total Critical Care Time - exclusive of procedural time: 40 minutes.  ==============================================================  Critical care was necessary to treat or prevent imminent or life-threatening deterioration of the following conditions: sepsis.   The following critical care procedures were done by me (see procedure notes): pulse oximetry.   Critical care was time spent personally by me on the following activities: obtaining history from patient or relative, examination of patient, review of old charts, ordering lab, x-rays, and/or EKG, development of treatment plan with patient or relative, ordering and performing treatments and interventions, evaluation of patient's response to  treatment, re-evaluation of patient's conition, interpretation of cardiac measurements and end of life discussions.   Critical Care Condition: life-threatening         I, Dr. Timothy Rodríguez, personally performed the services described in this documentation. All medical record entries made by the scribe were at my direction and in my presence.  I have reviewed the chart and agree that the record reflects my personal performance and is accurate and complete. Timothy Rodríguez MD.  9:17 PM 09/11/2018             Clinical Impression:   The primary encounter diagnosis was Acute UTI. Diagnoses of Bradycardia, Severe sepsis, and Altered mental status, unspecified altered mental status type were also pertinent to this visit.      Disposition:   Disposition: Admitted  Condition: Serious                        Timothy Rodríguez MD  09/12/18 0647

## 2018-09-12 NOTE — ASSESSMENT & PLAN NOTE
Secondary to UTI  Associated with lactic acidosis and leukocytosis  Heart rate blunted by daily BB and digoxin   Received IV fluid bolus in ER  Check lactic acid q 4 hours  Check pro calcitonin   Concerns about DIC, pt bleeding from urine and all venipuncture sites  Empiric coverage with Cefepime

## 2018-09-12 NOTE — HPI
Pt was sent from [facility] for hematuria and altered mental status. Pt was difficult to arouse, now after IV fluids, awake and talking unintelligible. Unable to obtain history and ROS from pt due to encephalopathy. Records did not offer any up to date information. In July, pt was treated for septic shock due to UTI at [facility]. Pt required pressors along with mechanical ventilation at that time.

## 2018-09-12 NOTE — PLAN OF CARE
09/12/18 1040   Discharge Assessment   Assessment Type Discharge Planning Assessment   Confirmed/corrected address and phone number on facesheet? Yes   Assessment information obtained from? Other   Expected Length of Stay (days) 4   Prior to hospitilization cognitive status: Alert/Oriented;Not Oriented to Person;Not Oriented to Place;Not Oriented to Time   Lives With other (see comments)  ([facility] snf )     I verified with [facility] that the pt is a resident there.   I left a message for her daughter, Clau Blackmon (ph#[phone]) to complete assessment and for Dr Silver to speak with......ANNALISA Kessler CM

## 2018-09-12 NOTE — PLAN OF CARE
I called Dr. Holman per nursing communication order to report the home med state that she is on ASA OD and Eliquis BID. Urine is bloody and draining into the urimeter.

## 2018-09-12 NOTE — PLAN OF CARE
Dentures to upper and lower in, cell phone with pt, she also has 2 rings on the ring finger and 1 on the middle finger, remains confused, taking sips of water PO .

## 2018-09-12 NOTE — CONSULTS
[facility] Consult Note - [place]  Staff: MD Manda    Referring provider and please cc:   PCP: [facility]: inactive    Chief Complaint: hematuria, uti    Subjective:        HPI: Marjorie Macario is a 78 y.o. female presents with     [facility] resident with h/o htn, PE and VIDA presented with AMS and hematuria (per nursing staff) and admitted to ICU for sepsis.  She was recently admitted for this past July to [facility] has a h/o sepsis from skin source/wound and AMS from tramadol . She is on apixaban for PE. I am unclear what her baseline mental status is and cannot find documentation on this. Today in the room she cannot answer any of my questions correctly and she is talking on the phone to herself.     ctu showed no hydro,  stones, no tumors and diffuse subQ fat stranding with b pleural effusions. Fishman in bladder.     Fishman had dark red clear urine. I irrigated it to clear easily with 60cc of normal saline. Unclear if she has a h/o smoking or recurrent utis' although review of her previous cultures from [facility] show no obvious infection. She does not have indwelling fishman and when fishman place no documented residual recorded.     +BLE edema. +encephalopathic but baseline MS unclear      9/11/18 NGTD, cath: red, 2+bili, 3+protein, tr leukocytes  7/9/18  Ng  6/25/18 Multiple org  6/7/18  Yeast  1/4/18  ng    ECOG Status: ?      REVIEW OF SYSTEMS:  uto secondary to mental status     PMHx:  Acute hypercapnic respiratory failure requiring mechanical intubation- extubated 7/10, better  Severe pulmonary hypertension with cor pulmonale-  Recent Acute cardiogenic shock   Mild pulmonary edema and small bilateral pleural effusions with left lower lobe atelectasis  Moderate malnutrition  Edema in all limbs- improving  Chronic atrial fibrillation -rate just over 100 at rest  s/p transaminitis -most likely passive congestion from the right heart failure-resolved  Acute blood loss anemia  from coagulopathy and bleeding from right IJ access- s/p multiple FFPs and PRBCs- stable (Eliquis resumed)  RLE Venostasis ulcer  Chronic venous stasis  Chronic diastolic heart failure with right-sided heart failure cor pulmonale  Essential hypertension  Hyperlipidemia  History of PE and status post IVC filter -details unknown  VDIA  Full code      PSHx:  Past Surgical History:   Procedure Laterality Date    APPENDECTOMY      CATARACT EXTRACTION      HYSTERECTOMY      OOPHORECTOMY      SHOULDER OPEN ROTATOR CUFF REPAIR Left [place] 2015    TOTAL KNEE ARTHROPLASTY Bilateral 2012, 2013     Urologic or Gynecologic Surgery:     Stents/Valves/Foreign Bodies: IVC filter  Cardiac Evaluation:     Screening Studies  Colonoscopy:     Family History   Problem Relation Age of Onset    Heart disease Mother     Cancer Sister     Breast cancer Sister     Diabetes Brother        Social hx  Lives in [place], unclear why she is a resident there  Children: 5  No recorded history and UTO from patient.     Allergies:  Penicillins    Medications: reviewed   Anticoagulation: Yes - apaxiban and ASA    Objective:     Vitals:    09/12/18 0345   BP:    Pulse: 60   Resp: 17   Temp:        General:WDWN in NAD  Eyes: PERRLA, normal conjunctiva  Respiratory: no increased work on breathing. No wheezing.   Cardiovascular: No obvious extremity edema. Warm and well perfused.   GI: no palpation of masses. No tenderness. No hepatosplenomegaly to palpation.  Musculoskeletal: normal range of motion of bilateral upper extremities. Normal muscle strength and tone.  Skin: no obvious rashes or lesions. No tightening of skin noted.  Neurologic: CN grossly normal. Normal sensation.   Psychiatric: awake, alert and oriented x 3. Mood and affect normal. Cooperative.    Pelvic exam  Deferred    [place] - see hpi    LABS REVIEW:      Lab Results   Component Value Date    WBC 12.50 09/12/2018    HGB 10.0 (L) 09/12/2018    HCT 34.7 (L) 09/12/2018    MCV 75 (L)  09/12/2018     09/12/2018     BMP  Lab Results   Component Value Date     09/12/2018    K 4.8 09/12/2018    CL 97 09/12/2018    CO2 26 09/12/2018    BUN 43 (H) 09/12/2018    CREATININE 1.0 09/12/2018    CALCIUM 9.5 09/12/2018    ANIONGAP 15 09/12/2018    ESTGFRAFRICA >60 09/12/2018    EGFRNONAA 54 (A) 09/12/2018         PATHOLOGY REVIEW:  Urine cytology sent    RADIOGRAPHIC REVIEW:  ctu 9/12/18  Small volume of ascites, small bilateral pleural effusions and posterior bibasilar atelectasis.  Extensive subcutaneous fat stranding and fluid.  Diverticulosis without CT findings of acute diverticulitis.  Renal scarring.\  Additional findings as detailed above including IVC filter, osseous degenerative change.    CTA 6/6/18  1. No evidence of pulmonary embolism to the segmental arterial level. If there  is continued clinical concern, consider further evaluation with lower extremity  doppler.  2. Moderate cardiomegaly with small right greater than left pleural effusions  and adjacent atelectasis.    Ct 5/6/17 [facility]  CT Abdomen: Liver: The liver is normal size and imaging appearance. Gallbladder: The gallbladder is unremarkable without acute cholecystitis. Biliary Tree: There is no intra or extrahepatic duct dilation. Spleen: The spleen is normal. Pancreas: The pancreas is normal. Adrenal Glands: The adrenal glands appear within normal limits. Kidneys: The kidneys are normal in imaging appearance without hydronephrosis or hydroureter. Vasculature: There are scattered calcified atheromatous mural plaques throughout the aorta and its major branch vessels. The aorta is normal in course and caliber. There is an opt ease infrarenal IVC filter in expected configuration. Lymph nodes: No abdominal lymphadenopathy is seen. Intraperitoneal structures: There is no ascites. Bowel: The partially opacified bowel is within normal limits. Abdominal wall: There is an 8.3 x 7.1 x 4.9 cm (TR X CC X AP) intramuscular hematoma within  the left lateral aspect of the rectus muscle. Musculoskeletal: There is chronic degenerative disc disease and facet arthropathy throughout the lumbar spine with no aggressive appearing lytic or blastic lesions. CT Pelvis: Bladder: The bladder is partially decompressed. That being said, there is mild circumferential bladder wall thickening. In the appropriate clinical setting this may relate to a mild degree of infection or inflammation; consider correlation with urinalysis. Reproductive Organs: The uterus is not visualized. Pelvic Lymph nodes: There are multiple mildly prominent lymph nodes in the left inguinal region and along the left pelvic sidewall, possibly reactive in nature with the largest lymph node for example measuring 2.5 x 1.1 cm (image 166 series 3) IMPRESSION: 1. Moderate size intramuscular hematoma within the inferior left lateral rectus muscle. 2. The bladder is partially decompressed. That being said, there is mild circumferential bladder wall thickening. In the appropriate clinical setting this may relate to a mild degree of infection or inflammation; consider correlation with urinalysis. 3. Mild cardiac enlargement with a trace pericardial         Assessment:       1. Acute UTI    2. Bradycardia    3. Severe sepsis    4. Altered mental status, unspecified altered mental status type    5. Hematuria, unspecified type          Plan:     Send urine cytology- collected today  First documented uti, f/u urine culture  Hematuria likely related to uti and apixaban, irrigated to clear. ctu showed no source.   Continue to irrigate q2 hours prn hematuria  Will schedule outpt cysto to complete workup  Voiding trial prior to discharge, need to check residuals and see if pt is incontinent.     Stephanie Holman MD

## 2018-09-13 LAB
ANION GAP SERPL CALC-SCNC: 12 MMOL/L
ANISOCYTOSIS BLD QL SMEAR: ABNORMAL
BASOPHILS NFR BLD: 0 %
BUN SERPL-MCNC: 47 MG/DL
CALCIUM SERPL-MCNC: 9.1 MG/DL
CHLORIDE SERPL-SCNC: 97 MMOL/L
CK MB SERPL-MCNC: 2.3 NG/ML
CK MB SERPL-MCNC: 3.4 NG/ML
CK MB SERPL-MCNC: 5.1 NG/ML
CK MB SERPL-RTO: 6.8 %
CK MB SERPL-RTO: 7.6 %
CK MB SERPL-RTO: 7.7 %
CK SERPL-CCNC: 30 U/L
CK SERPL-CCNC: 30 U/L
CK SERPL-CCNC: 45 U/L
CK SERPL-CCNC: 45 U/L
CK SERPL-CCNC: 75 U/L
CK SERPL-CCNC: 75 U/L
CO2 SERPL-SCNC: 29 MMOL/L
CREAT SERPL-MCNC: 1.1 MG/DL
DIFFERENTIAL METHOD: ABNORMAL
EOSINOPHIL NFR BLD: 2 %
ERYTHROCYTE [DISTWIDTH] IN BLOOD BY AUTOMATED COUNT: 23.3 %
EST. GFR  (AFRICAN AMERICAN): 55 ML/MIN/1.73 M^2
EST. GFR  (NON AFRICAN AMERICAN): 48 ML/MIN/1.73 M^2
GLUCOSE SERPL-MCNC: 85 MG/DL
HCT VFR BLD AUTO: 31.4 %
HGB BLD-MCNC: 9.1 G/DL
INR PPP: 2.4
LACTATE SERPL-SCNC: 2.3 MMOL/L
LYMPHOCYTES NFR BLD: 9 %
MAGNESIUM SERPL-MCNC: 1.7 MG/DL
MCH RBC QN AUTO: 21.7 PG
MCHC RBC AUTO-ENTMCNC: 28.9 G/DL
MCV RBC AUTO: 75 FL
MONOCYTES NFR BLD: 8 %
NEUTROPHILS NFR BLD: 81 %
PHOSPHATE SERPL-MCNC: 4 MG/DL
PLATELET # BLD AUTO: 302 K/UL
PLATELET BLD QL SMEAR: ABNORMAL
PMV BLD AUTO: 9.3 FL
POCT GLUCOSE: 105 MG/DL (ref 70–110)
POCT GLUCOSE: 61 MG/DL (ref 70–110)
POCT GLUCOSE: 62 MG/DL (ref 70–110)
POCT GLUCOSE: 91 MG/DL (ref 70–110)
POCT GLUCOSE: 95 MG/DL (ref 70–110)
POTASSIUM SERPL-SCNC: 4.4 MMOL/L
PROTHROMBIN TIME: 23.9 SEC
RBC # BLD AUTO: 4.17 M/UL
SODIUM SERPL-SCNC: 138 MMOL/L
TROPONIN I SERPL DL<=0.01 NG/ML-MCNC: 0.01 NG/ML
TROPONIN I SERPL DL<=0.01 NG/ML-MCNC: 0.02 NG/ML
WBC # BLD AUTO: 12 K/UL

## 2018-09-13 PROCEDURE — 83605 ASSAY OF LACTIC ACID: CPT

## 2018-09-13 PROCEDURE — 25000003 PHARM REV CODE 250: Performed by: NURSE PRACTITIONER

## 2018-09-13 PROCEDURE — 85610 PROTHROMBIN TIME: CPT

## 2018-09-13 PROCEDURE — 99233 SBSQ HOSP IP/OBS HIGH 50: CPT | Mod: ,,, | Performed by: INTERNAL MEDICINE

## 2018-09-13 PROCEDURE — 85027 COMPLETE CBC AUTOMATED: CPT

## 2018-09-13 PROCEDURE — 82553 CREATINE MB FRACTION: CPT

## 2018-09-13 PROCEDURE — 82553 CREATINE MB FRACTION: CPT | Mod: 91

## 2018-09-13 PROCEDURE — 20000000 HC ICU ROOM

## 2018-09-13 PROCEDURE — 36569 INSJ PICC 5 YR+ W/O IMAGING: CPT

## 2018-09-13 PROCEDURE — 97802 MEDICAL NUTRITION INDIV IN: CPT

## 2018-09-13 PROCEDURE — 94761 N-INVAS EAR/PLS OXIMETRY MLT: CPT

## 2018-09-13 PROCEDURE — 84100 ASSAY OF PHOSPHORUS: CPT

## 2018-09-13 PROCEDURE — 63600175 PHARM REV CODE 636 W HCPCS: Performed by: NURSE PRACTITIONER

## 2018-09-13 PROCEDURE — 93005 ELECTROCARDIOGRAM TRACING: CPT

## 2018-09-13 PROCEDURE — 84484 ASSAY OF TROPONIN QUANT: CPT | Mod: 91

## 2018-09-13 PROCEDURE — 36415 COLL VENOUS BLD VENIPUNCTURE: CPT

## 2018-09-13 PROCEDURE — 84484 ASSAY OF TROPONIN QUANT: CPT

## 2018-09-13 PROCEDURE — 76937 US GUIDE VASCULAR ACCESS: CPT

## 2018-09-13 PROCEDURE — 80048 BASIC METABOLIC PNL TOTAL CA: CPT

## 2018-09-13 PROCEDURE — 85007 BL SMEAR W/DIFF WBC COUNT: CPT

## 2018-09-13 PROCEDURE — 82550 ASSAY OF CK (CPK): CPT

## 2018-09-13 PROCEDURE — C1751 CATH, INF, PER/CENT/MIDLINE: HCPCS

## 2018-09-13 PROCEDURE — 02HV33Z INSERTION OF INFUSION DEVICE INTO SUPERIOR VENA CAVA, PERCUTANEOUS APPROACH: ICD-10-PCS | Performed by: INTERNAL MEDICINE

## 2018-09-13 PROCEDURE — 25000003 PHARM REV CODE 250: Performed by: INTERNAL MEDICINE

## 2018-09-13 PROCEDURE — 82550 ASSAY OF CK (CPK): CPT | Mod: 91

## 2018-09-13 PROCEDURE — 83735 ASSAY OF MAGNESIUM: CPT

## 2018-09-13 PROCEDURE — 27000221 HC OXYGEN, UP TO 24 HOURS

## 2018-09-13 PROCEDURE — 25000003 PHARM REV CODE 250: Performed by: HOSPITALIST

## 2018-09-13 RX ORDER — SODIUM CHLORIDE 0.9 % (FLUSH) 0.9 %
10 SYRINGE (ML) INJECTION EVERY 6 HOURS
Status: DISCONTINUED | OUTPATIENT
Start: 2018-09-14 | End: 2018-09-21

## 2018-09-13 RX ORDER — DIGOXIN 125 MCG
250 TABLET ORAL DAILY
Status: DISCONTINUED | OUTPATIENT
Start: 2018-09-14 | End: 2018-09-14

## 2018-09-13 RX ORDER — SODIUM CHLORIDE 0.9 % (FLUSH) 0.9 %
10 SYRINGE (ML) INJECTION
Status: DISCONTINUED | OUTPATIENT
Start: 2018-09-13 | End: 2018-09-21

## 2018-09-13 RX ADMIN — TIMOLOL MALEATE 1 DROP: 5 SOLUTION OPHTHALMIC at 09:09

## 2018-09-13 RX ADMIN — CEFEPIME HYDROCHLORIDE 1 G: 1 INJECTION, POWDER, FOR SOLUTION INTRAMUSCULAR; INTRAVENOUS at 02:09

## 2018-09-13 RX ADMIN — SODIUM CHLORIDE 150 ML/HR: 0.9 INJECTION, SOLUTION INTRAVENOUS at 08:09

## 2018-09-13 RX ADMIN — LEVOTHYROXINE SODIUM 25 MCG: 25 TABLET ORAL at 05:09

## 2018-09-13 RX ADMIN — PANTOPRAZOLE SODIUM 40 MG: 40 TABLET, DELAYED RELEASE ORAL at 09:09

## 2018-09-13 RX ADMIN — LATANOPROST 1 DROP: 50 SOLUTION OPHTHALMIC at 08:09

## 2018-09-13 RX ADMIN — SODIUM CHLORIDE 1000 ML: 0.9 INJECTION, SOLUTION INTRAVENOUS at 02:09

## 2018-09-13 NOTE — PLAN OF CARE
Problem: Patient Care Overview  Goal: Plan of Care Review  Outcome: Ongoing (interventions implemented as appropriate)  Patient with low blood sugar at start of shift, treated and reported. Also with low urine output, reported and received orders for IVF and IV fluid bolus. Patient drowsy throughout most of shift, now awake, alert and confused. Able to swallow pill without difficulty. Bathed. No falls this shift, safety maintained.

## 2018-09-13 NOTE — PLAN OF CARE
Problem: Patient Care Overview  Goal: Plan of Care Review  Outcome: Ongoing (interventions implemented as appropriate)  Pt remains in ICU confused, thought she was at [facility] and occasionally rambles about a . Pt oriented to month and year. 450 ml of red urine this shift. Dr. Holman saw patient at shift change and instructed to irrigate as needed. Pt able to feed self with poor appetite. Updated a son and daughter on plan of care. Safety maintained.

## 2018-09-13 NOTE — NURSING
Called and spoke to Dr Ramos to notify of patient low blood glucose 57, treated. Patient continues to be confused. New order received for repeat lactic acid and IV fluids.

## 2018-09-13 NOTE — PLAN OF CARE
This note also relates to the following rows which could not be included:  SpO2 - Cannot attach notes to unvalidated device data  Pulse - Cannot attach notes to unvalidated device data  Resp - Cannot attach notes to unvalidated device data  BP - Cannot attach notes to unvalidated device data       09/13/18 0700   Patient Assessment/Suction   Level of Consciousness (AVPU) alert   All Lung Fields Breath Sounds clear;diminished   PRE-TX-O2-ETCO2   O2 Device (Oxygen Therapy) nasal cannula   $ Is the patient on Low Flow Oxygen? Yes   Flow (L/min) 5   Oxygen Concentration (%) 40   Pulse Oximetry Type Continuous  (probe changed to right side of nares)   $ Pulse Oximetry - Multiple Charge Pulse Oximetry - Multiple   Oximetry Probe Site (changed to R nare)   Ready to Wean/Extubation Screen   FIO2<=50 (chart decimal) 0.4

## 2018-09-13 NOTE — PROCEDURES
"Marjorie Macario is a 78 y.o. female patient.    Temp: 97 °F (36.1 °C) (09/13/18 1530)  Pulse: (!) 48 (09/13/18 1530)  Resp: 15 (09/13/18 1530)  BP: (!) 73/51 (09/13/18 1530)  SpO2: (!) 85 % (09/13/18 1045)  Weight: 114.8 kg (253 lb 1.4 oz) (09/13/18 1224)  Height: 5' 6" (167.6 cm) (09/13/18 1228)    PICC  Date/Time: 9/13/2018 4:45 PM  Performed by: Stephanie Gómez RN  Consent Done: Yes  Time out: Immediately prior to procedure a time out was called to verify the correct patient, procedure, equipment, support staff and site/side marked as required  Indications: med administration and vascular access  Anesthesia: local infiltration  Local anesthetic: lidocaine 1% without epinephrine  Anesthetic Total (mL): 3  Preparation: skin prepped with ChloraPrep  Skin prep agent dried: skin prep agent completely dried prior to procedure  Sterile barriers: all five maximum sterile barriers used - cap, mask, sterile gown, sterile gloves, and large sterile sheet  Hand hygiene: hand hygiene performed prior to central venous catheter insertion  Location details: right basilic  Catheter type: double lumen  Catheter size: 5 Fr  Catheter Length: 42cm    Ultrasound guidance: yes  Vessel Caliber: medium and patent, compressibility normal  Vascular Doppler: not done  Needle advanced into vessel with real time Ultrasound guidance.  Guidewire confirmed in vessel.  Image recorded and saved.  Sterile sheath used.  no esophageal manometryNumber of attempts: 1  Post-procedure: blood return through all ports, chlorhexidine patch and sterile dressing applied  Estimated blood loss (mL): 5  Specimens: No  Implants: No  Assessment: placement verified by x-ray, tip termination and successful placement  Complications: none          Stephanie Gómez  9/13/2018  "

## 2018-09-13 NOTE — NURSING
Dr Ramos notified that patient had an hour without urine output. Catheter irrigated and did get irrigant in return that was tan colored. Bladder scan done showing empty bladder. Next hour 20 ml output. He was updated on this as well.

## 2018-09-13 NOTE — CONSULTS
"  [facility]  Adult Nutrition  Consult Note    SUMMARY     Recommendations    Recommendation/Intervention:   1) Recommend NPO until pt is more alert   2) When pt is more alert progress to cardiac diet   3) Add Boost Plus, BID for use when meal intake <=50%.    4) If pt is unable to consume meals po within 96 hrs consider enteral feedings.   5) Evaluated pt for malnutrition.  See malnutrition section of note.  6) RD to follow.    Goals: Pt will consume/receive >=75% EEN by RD f/u.   Nutrition Goal Status: new  Communication of RD Recs: (POC, sticky note)    Reason for Assessment    Reason for Assessment: consult  1. Acute UTI    2. Bradycardia    3. Severe sepsis    4. Altered mental status, unspecified altered mental status type    5. Hematuria, unspecified type      Past Medical History:   Diagnosis Date    Chronic diastolic heart failure 9/12/2018    Hypertension     Pulmonary embolism     Sleep apnea     on CPAP    Stage 2 chronic kidney disease 9/12/2018       General Information Comments: Admitted with AMS, hematuria. Pt not alert with no family at bedside. Pt has had weight gain since February 15, 2018.    Malnutrition:  Intake: ANA PAULA  Weight change: wt gain  Edema: 3+  Functional Screen: pt has ams, unable to use screen until pt at baseline  NFPE: ANA PAULA 2/2 nursing speaking to family member on phone regarding code status and pt is not alert  Hand : weak   Unable to use 2/2 not done using validated tool per [employer].  Using the hand device for actual measurements is what is validated and accepted per [employer].     Nutrition Discharge Planning: to be determined    Nutrition Risk Screen       Nutrition/Diet History    Do you have any cultural, spiritual, Christian conflicts, given your current situation?: none    Anthropometrics    Temp: 96.3 °F (35.7 °C)  Height Method: Estimated  Height: 5' 6"  Height (inches): 66 in  Weight Method: Bed Scale  Weight: 114.8 kg (253 lb 1.4 oz)  Weight (lb): " 253.09 lb  Ideal Body Weight (IBW), Female: 130 lb  % Ideal Body Weight, Female (lb): 194.68 lb  BMI (Calculated): 40.9  Usual Body Weight (UBW), k.4 kg(per chart review 2/15/18)  % Usual Body Weight: 144.89  % Weight Change From Usual Weight: 44.58 %       Lab/Procedures/Meds    Pertinent Labs Reviewed: reviewed  Lab Results   Component Value Date    ALBUMIN 2.8 (L) 2018     Lab Results   Component Value Date    WBC 12.00 2018     BMP  Lab Results   Component Value Date     2018    K 4.4 2018    CL 97 2018    CO2 29 2018    BUN 47 (H) 2018    CREATININE 1.1 2018    CALCIUM 9.1 2018    ANIONGAP 12 2018    ESTGFRAFRICA 55 (A) 2018    EGFRNONAA 48 (A) 2018     Lab Results   Component Value Date    CALCIUM 9.1 2018    PHOS 4.0 2018     Pertinent Medications Comments: pantoprazole    Physical Findings/Assessment    Oral/Mouth Cavity: tooth/teeth missing  Skin: edema(Julio score 14)    Estimated/Assessed Needs    Weight Used For Calorie Calculations: 114.8 kg (253 lb 1.4 oz)  Energy Calorie Requirements (kcal): 1640 (no AF required when BMI >25)  Energy Need Method: [facility]  Protein Requirements:  (1.2-2.0 gm/kg/day)  Weight Used For Protein Calculations: 59 kg (130 lb)     Fluid Need Method: RDA Method  RDA Method (mL): 1640  CHO Requirement: n/a      Nutrition Prescription Ordered    Current Diet Order: cardiac    Evaluation of Received Nutrient/Fluid Intake    Energy Calories Required: ANA PAULA  Protein Required: ANA PAULA  Fluid Required: met    Intake/Output Summary (Last 24 hours) at 2018 1244  Last data filed at 2018 0800  Gross per 24 hour   Intake 1588.33 ml   Output 580 ml   Net 1008.33 ml     % Intake of Estimated Energy Needs: ANA PAULA  % Meal Intake: ANA PAULA    18: Pt was not alert at visit. No meal intake in records currently.     Nutrition Risk    Level of Risk/Frequency of Follow-up: (2 x wkly)      Assessment and Plan    Inadequate energy intake r/t altered mental status as evidenced by      Monitor and Evaluation    Food and Nutrient Intake: energy intake  Food and Nutrient Adminstration: diet order  Physical Activity and Function: nutrition-related ADLs and IADLs  Anthropometric Measurements: weight, weight change  Biochemical Data, Medical Tests and Procedures: electrolyte and renal panel, inflammatory profile  Nutrition-Focused Physical Findings: skin     Nutrition Follow-Up  yes

## 2018-09-13 NOTE — PROGRESS NOTES
Informed Dr. Silver that pt systolic blood pressure is in the 70s.  Order for NS bolus 500cc.    Daughter at BS Melissa; obtained consent for PICC line.  Dara FANG, PICC nurse, will be up to place PICC this afternoon.  Unable to reach Shereen on the phone at this time.

## 2018-09-13 NOTE — NURSING
Spoke to pts daughters Shereen Carrero and Melissa Blackmon and pts son Jevon Blackmon.  All in agreement to make pt DNR.  Dr Silver notified and he is coming to speak to Shereen who is here.

## 2018-09-13 NOTE — PROGRESS NOTES
Pt HR on monitor reads 28 to 60s.  Afib.  Apical rate counted at 60 per min.  BP stable.  Pt awake and asymptomatic.  Received a call that not enough blood was drawn for pt troponin this morning.  Called Dr. Silver to notify of HR.  Order to consult [facility] cardiology; called ans service with stat consult.  Pt wants to eat and drink; assisted with tray.  Waiting to hear back from Dr. To.

## 2018-09-13 NOTE — PROGRESS NOTES
Progress Note  Hospital Medicine  Patient Name:Marjorie Macario  MRN:  [id number]  Patient Class: IP- Inpatient  Admit Date: 9/11/2018  Length of Stay: 1 days  Expected Discharge Date:   Attending Physician: Erin Silver MD  Primary Care Provider:  Sotero Le MD    SUBJECTIVE:     Principal Problem: Severe sepsis  Initial history of present illness: Pt was sent from [facility] for hematuria and altered mental status. Pt was difficult to arouse, now after IV fluids, awake and talking unintelligible. Unable to obtain history and ROS from pt due to encephalopathy. Records did not offer any up to date information. In July, pt was treated for septic shock due to UTI at [facility]. Pt required pressors along with mechanical ventilation at that time.     PMH/PSH/SH/FH/Meds: reviewed.    Symptoms/Review of Systems: In ICU, patient is with intermittent confusion, low blood pressure, having slow AF, HR dropping down to 28, asymptomatic. No shortness of breath, cough, chest pain or headache, fever or abdominal pain.   Diet:  Adequate intake.    Activity level: Up with assist   Pain:  Patient reports no pain.       OBJECTIVE:   Vital Signs (Most Recent):      Temp: 96.3 °F (35.7 °C) (09/13/18 0445)  Pulse: 64 (09/13/18 0615)  Resp: 14 (09/13/18 0615)  BP: (!) 107/57 (09/13/18 0600)  SpO2: 98 % (09/13/18 0615)       Vital Signs Range (Last 24H):  Temp:  [96.1 °F (35.6 °C)-98.8 °F (37.1 °C)]   Pulse:  [44-66]   Resp:  [10-22]   BP: ()/(46-84)   SpO2:  [82 %-98 %]     Weight: 114.8 kg (253 lb 1.4 oz)  Body mass index is 40.85 kg/m².    Intake/Output Summary (Last 24 hours) at 9/13/2018 0833  Last data filed at 9/13/2018 0800  Gross per 24 hour   Intake 1588.33 ml   Output 580 ml   Net 1008.33 ml     Physical Examination:  Constitutional: She is oriented to person, place, and time. No distress.   HENT:   Head: Normocephalic and atraumatic.   Eyes: Conjunctivae are normal. Right eye exhibits no discharge. Left eye exhibits no  discharge. No scleral icterus.   Neck: Normal range of motion. Neck supple. No JVD present. No thyromegaly present.   Cardiovascular: Normal rate and regular rhythm. Exam reveals no gallop and no friction rub.   Murmur heard.  Pulmonary/Chest: Effort normal and breath sounds normal. No respiratory distress. She has no wheezes. She has no rales. She exhibits no tenderness.   Abdominal: Soft. Bowel sounds are normal. She exhibits no distension. There is no tenderness. There is no rebound and no guarding.   Musculoskeletal: She exhibits no edema or tenderness.   Lymphadenopathy:     She has no cervical adenopathy.   Neurological: She is alert and oriented to person, place, and time. No cranial nerve deficit.   Skin: Skin is dry. Capillary refill takes less than 2 seconds. She is not diaphoretic.   Cool to touch  Pretibial bandage removed-small ulcer with a clean base noted.     Psychiatric: She has a normal mood and affect. Her behavior is normal. Judgment and thought content normal.    CBC:  Recent Labs   Lab  09/11/18 2152 09/12/18 0622   WBC  12.50  12.50   RBC  4.65  4.62   HGB  10.1*  10.0*   HCT  34.8*  34.7*   PLT  407*  327   MCV  75*  75*   MCH  21.8*  21.6*   MCHC  29.2*  28.8*   BMP  Recent Labs   Lab  09/11/18 2151 09/12/18 0622  09/13/18   0729   GLU  87  76  85   NA  138  138  138   K  5.0  4.8  4.4   CL  94*  97  97   CO2  27  26  29   BUN  43*  43*  47*   CREATININE  1.0  1.0  1.1   CALCIUM  9.8  9.5  9.1   MG   --   1.6  1.7      Diagnostic Results:  Microbiology Results (last 7 days)     Procedure Component Value Units Date/Time    Blood culture x two cultures. Draw prior to antibiotics. [[id number]] Collected:  09/11/18 2151    Order Status:  Completed Specimen:  Blood Updated:  09/13/18 0613     Blood Culture, Routine No Growth to date     Blood Culture, Routine No Growth to date    Narrative:       Aerobic and anaerobic    Blood culture x two cultures. Draw prior to antibiotics.  [[id number]] Collected:  09/11/18 2151    Order Status:  Completed Specimen:  Blood Updated:  09/13/18 0613     Blood Culture, Routine No Growth to date     Blood Culture, Routine No Growth to date    Narrative:       Aerobic and anaerobic    Urine culture [[id number]] Collected:  09/11/18 2200    Order Status:  No result Specimen:  Urine Updated:  09/11/18 2228         CXR: Perihilar and basal pleural and parenchymal changes.  Asymmetric CHF and pneumonia could both yield this appearance.    CT urogram:  Small volume of ascites, small bilateral pleural effusions and posterior bibasilar atelectasis.  Extensive subcutaneous fat stranding and fluid.  Diverticulosis without CT findings of acute diverticulitis.  Renal scarring.  Additional findings as detailed above including IVC filter, osseous degenerative change.    Assessment/Plan:     * Severe sepsis     Secondary to UTI.  Associated with lactic acidosis and leukocytosis  Heart rate blunted by daily BB and digoxin. Continue IVF hydration and trend LFTs  Continue Cefepime.       Encephalopathy, metabolic     Acute  Secondary to UTI  Mild improvement after IV fluids  Continue to monitor closely        Stage 2 chronic kidney disease     Creatinine at baseline  Will trend daily        Glaucoma     Chronic, stable  Continue BB eye gtts       Acquired hypothyroidism     Chronic  Continue Synthroid       Chronic diastolic heart failure     No evidence of exacerbation   Hold diuretics for now       Bradycardia   Atrial fibrillation with slow ventricular response.  Hold digoxin today. Discussed with Dr. CINDA Felton.     Essential hypertension     Currently on low side of normal   Hold anti-hypertensive's   Monitor and treat accordingly        Acute cystitis with hematuria     Empiric coverage with cefepime  Dr. Holman is following, intermittent bladder irrigation, outpatient cystoscopy planned.  Stop Apixaban         History of pulmonary embolism     Stop Apixaban now due  to abnormal bleeding      DVT prophylaxis: Use SCD and TEDs. Apixaban is on hold due to bleeding/hematuria.    Code status: Discussed with Lataniya daughter who confirms 4 of 5 sibleng requesting DNR code status. Answered all the questions.    Erin Silver MD  Department of Hospital Medicine   [facility]

## 2018-09-13 NOTE — NURSING
Called and spoke with Dr Ramos. Lactic 4.2 reported as well as low urine output last hour 20 ml/hr. Urine light Kvng Aid colored- no clots noted. Orders to continue NS @ 100 ml/hr and repeat lactic acid in the am. Will continue to monitor patient closely.

## 2018-09-13 NOTE — PLAN OF CARE
Problem: Patient Care Overview  Goal: Individualization & Mutuality  Recommendation/Intervention:   1) Recommend NPO until pt is more alert   2) When pt is more alert progress to cardiac diet   3) Add Boost Plus, BID for use when meal intake <=50%.    4) If pt is unable to consume meals po within 96 hrs consider enteral feedings.   5) Evaluated pt for malnutrition.  See malnutrition section of note.  6) RD to follow.    Goals: Pt will consume/receive >=75% EEN by RD f/u.   Nutrition Goal Status: new  Communication of RD Recs: (POC, sticky note)

## 2018-09-13 NOTE — PROGRESS NOTES
Dr. Silver in to see pt.  Updated on status.  Called daughter Shereen listed on facesheet.  Left message on cell phone; called work and home but mailboxes full and no answer.  No other names listed on facesheet.  Pt said earlier that she has five children: three girls and two boys.

## 2018-09-13 NOTE — PROGRESS NOTES
Priscila FANG has had extensive conversation with pt daughter Shereen who has arrived to the unit to see her mother as well as her uncle (pt brother) who is in the hospital on another floor.  Pt daughter is open to DNR for pt but she wishes to speak with her siblings first as well as Dr. Silver.

## 2018-09-13 NOTE — PROGRESS NOTES
"Called Dr. To because I have not heard from him. He states he will call Dr. Felton to see pt.  Dr. Felton called; I gave him the details on the patient.  He states he will be in to see her.  "Do not give digoxin or beta blockers."  Updated charge ANNALISA Francois.  "

## 2018-09-14 LAB
ANION GAP SERPL CALC-SCNC: 10 MMOL/L
ANISOCYTOSIS BLD QL SMEAR: ABNORMAL
BACTERIA UR CULT: NORMAL
BASOPHILS # BLD AUTO: ABNORMAL K/UL
BASOPHILS NFR BLD: 1 %
BUN SERPL-MCNC: 47 MG/DL
CALCIUM SERPL-MCNC: 8.7 MG/DL
CHLORIDE SERPL-SCNC: 97 MMOL/L
CK MB SERPL-MCNC: 2.1 NG/ML
CK MB SERPL-MCNC: 2.7 NG/ML
CK MB SERPL-MCNC: 3.5 NG/ML
CK MB SERPL-MCNC: 3.7 NG/ML
CK MB SERPL-RTO: 5.5 %
CK MB SERPL-RTO: 6.3 %
CK MB SERPL-RTO: 7.1 %
CK MB SERPL-RTO: 9.2 %
CK SERPL-CCNC: 38 U/L
CK SERPL-CCNC: 43 U/L
CK SERPL-CCNC: 43 U/L
CK SERPL-CCNC: 52 U/L
CK SERPL-CCNC: 52 U/L
CO2 SERPL-SCNC: 29 MMOL/L
CREAT SERPL-MCNC: 1.4 MG/DL
DIFFERENTIAL METHOD: ABNORMAL
EOSINOPHIL # BLD AUTO: ABNORMAL K/UL
EOSINOPHIL NFR BLD: 6 %
ERYTHROCYTE [DISTWIDTH] IN BLOOD BY AUTOMATED COUNT: 22 %
EST. GFR  (AFRICAN AMERICAN): 41 ML/MIN/1.73 M^2
EST. GFR  (NON AFRICAN AMERICAN): 36 ML/MIN/1.73 M^2
GIANT PLATELETS BLD QL SMEAR: PRESENT
GLUCOSE SERPL-MCNC: 91 MG/DL
HCT VFR BLD AUTO: 29.1 %
HGB BLD-MCNC: 8.9 G/DL
INR PPP: 2.2
LYMPHOCYTES # BLD AUTO: ABNORMAL K/UL
LYMPHOCYTES NFR BLD: 5 %
MAGNESIUM SERPL-MCNC: 1.6 MG/DL
MCH RBC QN AUTO: 22.7 PG
MCHC RBC AUTO-ENTMCNC: 30.6 G/DL
MCV RBC AUTO: 74 FL
MONOCYTES # BLD AUTO: ABNORMAL K/UL
MONOCYTES NFR BLD: 7 %
NEUTROPHILS NFR BLD: 80 %
NEUTS BAND NFR BLD MANUAL: 1 %
NRBC BLD-RTO: 5 /100 WBC
OVALOCYTES BLD QL SMEAR: ABNORMAL
PHOSPHATE SERPL-MCNC: 3.9 MG/DL
PLATELET # BLD AUTO: 327 K/UL
PLATELET BLD QL SMEAR: ABNORMAL
PMV BLD AUTO: 9.6 FL
POCT GLUCOSE: 102 MG/DL (ref 70–110)
POCT GLUCOSE: 114 MG/DL (ref 70–110)
POCT GLUCOSE: 125 MG/DL (ref 70–110)
POCT GLUCOSE: 82 MG/DL (ref 70–110)
POIKILOCYTOSIS BLD QL SMEAR: SLIGHT
POLYCHROMASIA BLD QL SMEAR: ABNORMAL
POTASSIUM SERPL-SCNC: 4.2 MMOL/L
PROTHROMBIN TIME: 22.1 SEC
RBC # BLD AUTO: 3.93 M/UL
SODIUM SERPL-SCNC: 136 MMOL/L
TARGETS BLD QL SMEAR: ABNORMAL
WBC # BLD AUTO: 11.5 K/UL

## 2018-09-14 PROCEDURE — 25000003 PHARM REV CODE 250: Performed by: INTERNAL MEDICINE

## 2018-09-14 PROCEDURE — 94761 N-INVAS EAR/PLS OXIMETRY MLT: CPT

## 2018-09-14 PROCEDURE — 20000000 HC ICU ROOM

## 2018-09-14 PROCEDURE — 84100 ASSAY OF PHOSPHORUS: CPT

## 2018-09-14 PROCEDURE — 82553 CREATINE MB FRACTION: CPT

## 2018-09-14 PROCEDURE — 83735 ASSAY OF MAGNESIUM: CPT

## 2018-09-14 PROCEDURE — A4216 STERILE WATER/SALINE, 10 ML: HCPCS | Performed by: INTERNAL MEDICINE

## 2018-09-14 PROCEDURE — 63600175 PHARM REV CODE 636 W HCPCS: Performed by: NURSE PRACTITIONER

## 2018-09-14 PROCEDURE — 85007 BL SMEAR W/DIFF WBC COUNT: CPT

## 2018-09-14 PROCEDURE — 80048 BASIC METABOLIC PNL TOTAL CA: CPT

## 2018-09-14 PROCEDURE — 82553 CREATINE MB FRACTION: CPT | Mod: 91

## 2018-09-14 PROCEDURE — 36415 COLL VENOUS BLD VENIPUNCTURE: CPT

## 2018-09-14 PROCEDURE — 99233 SBSQ HOSP IP/OBS HIGH 50: CPT | Mod: ,,, | Performed by: INTERNAL MEDICINE

## 2018-09-14 PROCEDURE — 82550 ASSAY OF CK (CPK): CPT

## 2018-09-14 PROCEDURE — 85027 COMPLETE CBC AUTOMATED: CPT

## 2018-09-14 PROCEDURE — 85610 PROTHROMBIN TIME: CPT

## 2018-09-14 PROCEDURE — 82550 ASSAY OF CK (CPK): CPT | Mod: 91

## 2018-09-14 PROCEDURE — 27000221 HC OXYGEN, UP TO 24 HOURS

## 2018-09-14 RX ADMIN — Medication 10 ML: at 12:09

## 2018-09-14 RX ADMIN — CEFEPIME HYDROCHLORIDE 1 G: 1 INJECTION, POWDER, FOR SOLUTION INTRAMUSCULAR; INTRAVENOUS at 03:09

## 2018-09-14 RX ADMIN — Medication 10 ML: at 05:09

## 2018-09-14 RX ADMIN — CEFEPIME HYDROCHLORIDE 1 G: 1 INJECTION, POWDER, FOR SOLUTION INTRAMUSCULAR; INTRAVENOUS at 02:09

## 2018-09-14 RX ADMIN — LATANOPROST 1 DROP: 50 SOLUTION OPHTHALMIC at 09:09

## 2018-09-14 RX ADMIN — TIMOLOL MALEATE 1 DROP: 5 SOLUTION OPHTHALMIC at 09:09

## 2018-09-14 NOTE — NURSING
Updated SANDRA Nuñez NP that patient coughing up small amount white frothy sputum and urine output remains low 5-10ml/hr. Paused IV fluids. No further orders at this time.

## 2018-09-14 NOTE — PLAN OF CARE
Problem: Patient Care Overview  Goal: Plan of Care Review  Outcome: Ongoing (interventions implemented as appropriate)  Care plan reviewed.  Safety maintained.  IV antibiotics continue for treatment of infection.  Patient on specialty bed, rotation therapy utilized.  Pillows under BUE due to edema, weeping of right arm noted.  Urine output very poor, is only 35 ml at this time in the shift.  Appetite fair to poor, but likes to drink fluids.   Dr. Felton came to see patient today. See his notes.   Patient is sinus bradycardia with 1st degree AVB.

## 2018-09-14 NOTE — NURSING
Notified SANDRA Nuñez NP that patient's BP is 70s systolic and urine output decreasing. HR in 30's-40s A fib. No new orders at this time.

## 2018-09-14 NOTE — PLAN OF CARE
Problem: Patient Care Overview  Goal: Plan of Care Review  Outcome: Ongoing (interventions implemented as appropriate)  Pt on 5L NC with 96% sats

## 2018-09-14 NOTE — CONSULTS
Dictated.  Isorhythmic AV dissociation; severe sinus bradycardia.  DC digoxin and BB.  May need PPM if bradycardia off dig and BB.  Pt sees Dr. Lunsford as per daughter.

## 2018-09-14 NOTE — PROGRESS NOTES
Progress Note  Hospital Medicine  Patient Name:Marjorie Macario  MRN:  [id number]  Patient Class: IP- Inpatient  Admit Date: 9/11/2018  Length of Stay: 2 days  Expected Discharge Date:   Attending Physician: Erin Silver MD  Primary Care Provider:  Sotero Le MD    SUBJECTIVE:     Principal Problem: Severe sepsis  Initial history of present illness: Pt was sent from [facility] for hematuria and altered mental status. Pt was difficult to arouse, now after IV fluids, awake and talking unintelligible. Unable to obtain history and ROS from pt due to encephalopathy. Records did not offer any up to date information. In July, pt was treated for septic shock due to UTI at [facility]. Pt required pressors along with mechanical ventilation at that time.     PMH/PSH/SH/FH/Meds: reviewed.    Symptoms/Review of Systems: In ICU, patient is with intermittent confusion, low blood pressure, having slow AF,  asymptomatic. No shortness of breath, cough, chest pain or headache, fever or abdominal pain. PICC line placed. Daughter is at bedside.  Diet:  Adequate intake.    Activity level: Up with assist   Pain:  Patient reports no pain.       OBJECTIVE:   Vital Signs (Most Recent):      Temp: 97.5 °F (36.4 °C) (09/14/18 0545)  Pulse: (!) 56 (09/14/18 0615)  Resp: 12 (09/14/18 0615)  BP: (!) 82/46 (09/14/18 0600)  SpO2: (!) 88 % (09/14/18 0615)       Vital Signs Range (Last 24H):  Temp:  [96.5 °F (35.8 °C)-98.7 °F (37.1 °C)]   Pulse:  [34-66]   Resp:  [0-39]   BP: ()/(36-80)   SpO2:  [76 %-97 %]     Weight: 114.8 kg (253 lb 1.4 oz)  Body mass index is 40.85 kg/m².    Intake/Output Summary (Last 24 hours) at 9/14/2018 0736  Last data filed at 9/14/2018 0600  Gross per 24 hour   Intake 70 ml   Output 160 ml   Net -90 ml     Physical Examination:  Constitutional: She is oriented to person, place, and time. No distress.   HENT:   Head: Normocephalic and atraumatic.   Eyes: Conjunctivae are normal. Right eye exhibits no discharge. Left eye  exhibits no discharge. No scleral icterus.   Neck: Normal range of motion. Neck supple. No JVD present. No thyromegaly present.   Cardiovascular: Normal rate and regular rhythm. Exam reveals no gallop and no friction rub.   Murmur heard.  Pulmonary/Chest: Effort normal and breath sounds normal. No respiratory distress. She has no wheezes. She has no rales. She exhibits no tenderness.   Abdominal: Soft. Bowel sounds are normal. She exhibits no distension. There is no tenderness. There is no rebound and no guarding.   Musculoskeletal: She exhibits no edema or tenderness. 1+ pitting edema.  Lymphadenopathy:     She has no cervical adenopathy.   Neurological: She is alert and oriented to person, place, and time. No cranial nerve deficit.   Skin: Skin is dry. Capillary refill takes less than 2 seconds. She is not diaphoretic.   Cool to touch  Pretibial bandage removed-small ulcer with a clean base noted.     Psychiatric: She has a normal mood and affect. Her behavior is normal. Judgment and thought content normal.    CBC:  Recent Labs   Lab  09/12/18 0622 09/13/18 0729 09/14/18 0418   WBC  12.50  12.00  11.50   RBC  4.62  4.17  3.93*   HGB  10.0*  9.1*  8.9*   HCT  34.7*  31.4*  29.1*   PLT  327  302  327   MCV  75*  75*  74*   MCH  21.6*  21.7*  22.7*   MCHC  28.8*  28.9*  30.6*   BMP  Recent Labs   Lab  09/12/18 0622 09/13/18 0729 09/14/18 0418   GLU  76  85  91   NA  138  138  136   K  4.8  4.4  4.2   CL  97  97  97   CO2  26  29  29   BUN  43*  47*  47*   CREATININE  1.0  1.1  1.4   CALCIUM  9.5  9.1  8.7   MG  1.6  1.7  1.6      Diagnostic Results:  Microbiology Results (last 7 days)     Procedure Component Value Units Date/Time    Blood culture x two cultures. Draw prior to antibiotics. [[id number]] Collected:  09/11/18 2151    Order Status:  Completed Specimen:  Blood Updated:  09/14/18 0613     Blood Culture, Routine No Growth to date     Blood Culture, Routine No Growth to date     Blood Culture,  Routine No Growth to date    Narrative:       Aerobic and anaerobic    Blood culture x two cultures. Draw prior to antibiotics. [[id number]] Collected:  09/11/18 2151    Order Status:  Completed Specimen:  Blood Updated:  09/14/18 0613     Blood Culture, Routine No Growth to date     Blood Culture, Routine No Growth to date     Blood Culture, Routine No Growth to date    Narrative:       Aerobic and anaerobic    Urine culture [[id number]] Collected:  09/11/18 2200    Order Status:  Completed Specimen:  Urine Updated:  09/13/18 0921     Urine Culture, Routine --     GRAM NEGATIVE KATE, LACTOSE   50,000 - 99,999 cfu/ml  Identification and susceptibility pending      Narrative:       Preferred Collection Type->Urine, Catheterized         CXR: Perihilar and basal pleural and parenchymal changes.  Asymmetric CHF and pneumonia could both yield this appearance.    CT urogram:  Small volume of ascites, small bilateral pleural effusions and posterior bibasilar atelectasis.  Extensive subcutaneous fat stranding and fluid.  Diverticulosis without CT findings of acute diverticulitis.  Renal scarring.  Additional findings as detailed above including IVC filter, osseous degenerative change.    CXR:   Appropriately positioned right PICC line.  Cardiomegaly.  Right basilar opacification representing consolidation and/or pleural effusion.    Assessment/Plan:     * Severe sepsis     Secondary to UTI.  Associated with lactic acidosis and leukocytosis  Continue IVF hydration and trend LFTs. Follow microbiology results.  Continue Cefepime.       Encephalopathy, metabolic     Acute  Secondary to UTI  Mild improvement after IV fluids  Continue to monitor closely        Stage 2 chronic kidney disease     Creatinine at baseline  Will trend daily        Glaucoma     Chronic, stable  Continue BB eye gtts       Acquired hypothyroidism     Chronic  Continue Synthroid       Chronic diastolic heart failure     No evidence of exacerbation    Hold diuretics for now       Bradycardia   Atrial fibrillation with slow ventricular response.  Off BB and Digoxin. Follow cardiology recommendations.     Essential hypertension     Currently on low side of normal   Hold anti-hypertensive's   Monitor and treat accordingly        Acute cystitis with hematuria     Empiric coverage with cefepime  Dr. Holman is following, intermittent bladder irrigation, outpatient cystoscopy planned.  Stop Apixaban         History of pulmonary embolism     Stop Apixaban now due to abnormal bleeding      DVT prophylaxis: Use SCD and TEDs. Apixaban is on hold due to bleeding/hematuria.    Code status: DNR.    Discussed with daughter, answered all the questions.     Erin Silver MD  Department of Hospital Medicine   [facility]

## 2018-09-14 NOTE — PLAN OF CARE
Problem: Patient Care Overview  Goal: Plan of Care Review  Outcome: Ongoing (interventions implemented as appropriate)  Minimal urine output throughout shift, communicated to NP. Toward end of shift beginning to cough up white frothy sputum. Slept most of shift. Did wake for bath talkative but confused. AM labs pending. No falls this shift, safety maintained. Continued sarah in 30-50 range a fib.

## 2018-09-14 NOTE — PLAN OF CARE
Problem: Patient Care Overview  Goal: Plan of Care Review  Outcome: Ongoing (interventions implemented as appropriate)  Pt HR was as low as 27; cardiology consulted, digoxin and beta blockers held again today.  Pt appears asymptomatic; toward the end of the shift her SBP began to drop into the 70s and 80s.  Pt family consulted together and with Dr. Silver and decided to make the pt a DNR.

## 2018-09-15 LAB
ANION GAP SERPL CALC-SCNC: 11 MMOL/L
ANISOCYTOSIS BLD QL SMEAR: ABNORMAL
BASO STIPL BLD QL SMEAR: ABNORMAL
BASOPHILS NFR BLD: 1 %
BUN SERPL-MCNC: 53 MG/DL
CALCIUM SERPL-MCNC: 8.7 MG/DL
CHLORIDE SERPL-SCNC: 96 MMOL/L
CK MB SERPL-MCNC: 1.6 NG/ML
CK MB SERPL-MCNC: 1.9 NG/ML
CK MB SERPL-RTO: 4.1 %
CK MB SERPL-RTO: 4.6 %
CK SERPL-CCNC: 35 U/L
CK SERPL-CCNC: 35 U/L
CK SERPL-CCNC: 46 U/L
CK SERPL-CCNC: 46 U/L
CO2 SERPL-SCNC: 29 MMOL/L
CREAT SERPL-MCNC: 1.8 MG/DL
DIFFERENTIAL METHOD: ABNORMAL
DIGOXIN SERPL-MCNC: 1.7 NG/ML
EOSINOPHIL NFR BLD: 3 %
ERYTHROCYTE [DISTWIDTH] IN BLOOD BY AUTOMATED COUNT: 23.6 %
EST. GFR  (AFRICAN AMERICAN): 30 ML/MIN/1.73 M^2
EST. GFR  (NON AFRICAN AMERICAN): 26 ML/MIN/1.73 M^2
GIANT PLATELETS BLD QL SMEAR: PRESENT
GLUCOSE SERPL-MCNC: 103 MG/DL
HCT VFR BLD AUTO: 29.2 %
HGB BLD-MCNC: 8.6 G/DL
HYPOCHROMIA BLD QL SMEAR: ABNORMAL
INR PPP: 1.7
LYMPHOCYTES NFR BLD: 11 %
MAGNESIUM SERPL-MCNC: 1.7 MG/DL
MCH RBC QN AUTO: 21.9 PG
MCHC RBC AUTO-ENTMCNC: 29.4 G/DL
MCV RBC AUTO: 75 FL
MONOCYTES NFR BLD: 6 %
NEUTROPHILS NFR BLD: 79 %
NRBC BLD-RTO: 6 /100 WBC
PHOSPHATE SERPL-MCNC: 3.8 MG/DL
PLATELET # BLD AUTO: 292 K/UL
PLATELET BLD QL SMEAR: ABNORMAL
PMV BLD AUTO: 9.2 FL
POCT GLUCOSE: 106 MG/DL (ref 70–110)
POCT GLUCOSE: 118 MG/DL (ref 70–110)
POCT GLUCOSE: 97 MG/DL (ref 70–110)
POIKILOCYTOSIS BLD QL SMEAR: ABNORMAL
POLYCHROMASIA BLD QL SMEAR: ABNORMAL
POTASSIUM SERPL-SCNC: 4.3 MMOL/L
PROTHROMBIN TIME: 17.4 SEC
RBC # BLD AUTO: 3.91 M/UL
SODIUM SERPL-SCNC: 136 MMOL/L
TARGETS BLD QL SMEAR: ABNORMAL
WBC # BLD AUTO: 9.7 K/UL

## 2018-09-15 PROCEDURE — 80162 ASSAY OF DIGOXIN TOTAL: CPT

## 2018-09-15 PROCEDURE — 27000221 HC OXYGEN, UP TO 24 HOURS

## 2018-09-15 PROCEDURE — 25000003 PHARM REV CODE 250: Performed by: NURSE PRACTITIONER

## 2018-09-15 PROCEDURE — 25000003 PHARM REV CODE 250: Performed by: INTERNAL MEDICINE

## 2018-09-15 PROCEDURE — 20000000 HC ICU ROOM

## 2018-09-15 PROCEDURE — 82550 ASSAY OF CK (CPK): CPT

## 2018-09-15 PROCEDURE — 25000242 PHARM REV CODE 250 ALT 637 W/ HCPCS: Performed by: INTERNAL MEDICINE

## 2018-09-15 PROCEDURE — 83735 ASSAY OF MAGNESIUM: CPT

## 2018-09-15 PROCEDURE — 63600175 PHARM REV CODE 636 W HCPCS: Performed by: NURSE PRACTITIONER

## 2018-09-15 PROCEDURE — 94640 AIRWAY INHALATION TREATMENT: CPT

## 2018-09-15 PROCEDURE — 85610 PROTHROMBIN TIME: CPT

## 2018-09-15 PROCEDURE — 82550 ASSAY OF CK (CPK): CPT | Mod: 91

## 2018-09-15 PROCEDURE — 63600175 PHARM REV CODE 636 W HCPCS: Performed by: SPECIALIST

## 2018-09-15 PROCEDURE — A4216 STERILE WATER/SALINE, 10 ML: HCPCS | Performed by: INTERNAL MEDICINE

## 2018-09-15 PROCEDURE — 84100 ASSAY OF PHOSPHORUS: CPT

## 2018-09-15 PROCEDURE — 94761 N-INVAS EAR/PLS OXIMETRY MLT: CPT

## 2018-09-15 PROCEDURE — 82553 CREATINE MB FRACTION: CPT | Mod: 91

## 2018-09-15 PROCEDURE — A4216 STERILE WATER/SALINE, 10 ML: HCPCS | Performed by: NURSE PRACTITIONER

## 2018-09-15 PROCEDURE — 25000003 PHARM REV CODE 250: Performed by: SPECIALIST

## 2018-09-15 PROCEDURE — 85007 BL SMEAR W/DIFF WBC COUNT: CPT

## 2018-09-15 PROCEDURE — 80048 BASIC METABOLIC PNL TOTAL CA: CPT

## 2018-09-15 PROCEDURE — 36415 COLL VENOUS BLD VENIPUNCTURE: CPT

## 2018-09-15 PROCEDURE — 85027 COMPLETE CBC AUTOMATED: CPT

## 2018-09-15 PROCEDURE — 82553 CREATINE MB FRACTION: CPT

## 2018-09-15 RX ORDER — NOREPINEPHRINE BITARTRATE 0.03 MG/ML
0.02 INJECTION, SOLUTION INTRAVENOUS CONTINUOUS
Status: DISCONTINUED | OUTPATIENT
Start: 2018-09-15 | End: 2018-09-15

## 2018-09-15 RX ORDER — HYDROCODONE POLISTIREX AND CHLORPHENIRAMINE POLISTIREX 10; 8 MG/5ML; MG/5ML
2.5 SUSPENSION, EXTENDED RELEASE ORAL EVERY 12 HOURS PRN
Status: DISCONTINUED | OUTPATIENT
Start: 2018-09-15 | End: 2018-09-15

## 2018-09-15 RX ORDER — IPRATROPIUM BROMIDE AND ALBUTEROL SULFATE 2.5; .5 MG/3ML; MG/3ML
3 SOLUTION RESPIRATORY (INHALATION) EVERY 6 HOURS
Status: DISCONTINUED | OUTPATIENT
Start: 2018-09-15 | End: 2018-09-24 | Stop reason: HOSPADM

## 2018-09-15 RX ORDER — DOBUTAMINE HYDROCHLORIDE 400 MG/100ML
2.5 INJECTION, SOLUTION INTRAVENOUS CONTINUOUS
Status: DISCONTINUED | OUTPATIENT
Start: 2018-09-15 | End: 2018-09-20

## 2018-09-15 RX ADMIN — LATANOPROST 1 DROP: 50 SOLUTION OPHTHALMIC at 09:09

## 2018-09-15 RX ADMIN — CEFEPIME HYDROCHLORIDE 1 G: 1 INJECTION, POWDER, FOR SOLUTION INTRAMUSCULAR; INTRAVENOUS at 02:09

## 2018-09-15 RX ADMIN — PANTOPRAZOLE SODIUM 40 MG: 40 TABLET, DELAYED RELEASE ORAL at 10:09

## 2018-09-15 RX ADMIN — Medication 10 ML: at 12:09

## 2018-09-15 RX ADMIN — PHENYLEPHRINE HYDROCHLORIDE 1.5 MCG/KG/MIN: 10 INJECTION INTRAVENOUS at 05:09

## 2018-09-15 RX ADMIN — ACETAMINOPHEN 650 MG: 325 TABLET, FILM COATED ORAL at 02:09

## 2018-09-15 RX ADMIN — IPRATROPIUM BROMIDE AND ALBUTEROL SULFATE 3 ML: .5; 3 SOLUTION RESPIRATORY (INHALATION) at 03:09

## 2018-09-15 RX ADMIN — SODIUM CHLORIDE: 0.9 INJECTION, SOLUTION INTRAVENOUS at 01:09

## 2018-09-15 RX ADMIN — SODIUM CHLORIDE 500 ML: 0.9 INJECTION, SOLUTION INTRAVENOUS at 05:09

## 2018-09-15 RX ADMIN — LEVOTHYROXINE SODIUM 25 MCG: 25 TABLET ORAL at 06:09

## 2018-09-15 RX ADMIN — TIMOLOL MALEATE 1 DROP: 5 SOLUTION OPHTHALMIC at 10:09

## 2018-09-15 RX ADMIN — Medication 5 ML: at 12:09

## 2018-09-15 RX ADMIN — SODIUM CHLORIDE 500 ML: 0.9 INJECTION, SOLUTION INTRAVENOUS at 04:09

## 2018-09-15 RX ADMIN — Medication 10 ML: at 06:09

## 2018-09-15 RX ADMIN — TIMOLOL MALEATE 1 DROP: 5 SOLUTION OPHTHALMIC at 09:09

## 2018-09-15 RX ADMIN — CEFEPIME HYDROCHLORIDE 1 G: 1 INJECTION, POWDER, FOR SOLUTION INTRAMUSCULAR; INTRAVENOUS at 01:09

## 2018-09-15 RX ADMIN — DOBUTAMINE IN DEXTROSE 5 MCG/KG/MIN: 200 INJECTION, SOLUTION INTRAVENOUS at 05:09

## 2018-09-15 RX ADMIN — IPRATROPIUM BROMIDE AND ALBUTEROL SULFATE 3 ML: .5; 3 SOLUTION RESPIRATORY (INHALATION) at 07:09

## 2018-09-15 RX ADMIN — HYDROCODONE POLISTIREX AND CHLORPHENIRAMINE POLISITREX 2.5 ML: 10; 8 SUSPENSION, EXTENDED RELEASE ORAL at 02:09

## 2018-09-15 RX ADMIN — Medication 10 ML: at 05:09

## 2018-09-15 NOTE — PLAN OF CARE
Problem: Patient Care Overview  Goal: Plan of Care Review  Outcome: Ongoing (interventions implemented as appropriate)  Pt on 5L NC with 93% sats

## 2018-09-15 NOTE — PROGRESS NOTES
[facility]  Cardiology  Progress Note    Patient Name: Marjorie Macario  MRN: [id number]  Admission Date: 9/11/2018  Hospital Length of Stay: 3 days  Code Status: DNR   Attending Physician: Erin Silver MD   Primary Care Physician: Sotero Le MD  Expected Discharge Date:   Principal Problem:Severe sepsis    Subjective:     Hospital Course: had sepsis, awake has uti pos pneumonia    Interval History: ekg with avb awaiting recovering of pos dig toxicity    Review of Systems   Unable to perform ROS: other   Cardiovascular: Positive for dyspnea on exertion.   Respiratory: Positive for cough.    Gastrointestinal: Negative.      Objective:     Vital Signs (Most Recent):  Temp: 98 °F (36.7 °C) (09/15/18 0800)  Pulse: 74 (09/15/18 1000)  Resp: 18 (09/15/18 1000)  BP: (!) 94/55 (09/15/18 0715)  SpO2: (!) 92 % (09/15/18 1000) Vital Signs (24h Range):  Temp:  [97.2 °F (36.2 °C)-98.6 °F (37 °C)] 98 °F (36.7 °C)  Pulse:  [43-74] 74  Resp:  [13-25] 18  SpO2:  [78 %-96 %] 92 %  BP: ()/(46-71) 94/55     Weight: 126.3 kg (278 lb 7.1 oz)  Body mass index is 44.94 kg/m².    SpO2: (!) 92 %  O2 Device (Oxygen Therapy): nasal cannula      Intake/Output Summary (Last 24 hours) at 9/15/2018 1011  Last data filed at 9/15/2018 0600  Gross per 24 hour   Intake 3100 ml   Output 160 ml   Net 2940 ml       Lines/Drains/Airways     Peripherally Inserted Central Catheter Line                 PICC Double Lumen 09/13/18 1645 right basilic 1 day          Drain                 Urethral Catheter 09/12/18 0020 3 days          Peripheral Intravenous Line                 Peripheral IV - Single Lumen 09/12/18 0103 Right Forearm 3 days                Physical Exam   Constitutional: She appears well-developed and well-nourished.   Eyes: Conjunctivae are normal. Pupils are equal, round, and reactive to light.   Neck: Neck supple.   Cardiovascular: Normal rate.   Pulmonary/Chest: She is in respiratory distress.   Abdominal: Soft.    Musculoskeletal: Normal range of motion.   Neurological: She is alert.       Significant Labs:   ABG: No results for input(s): PH, PCO2, HCO3, POCSATURATED, BE in the last 48 hours., CMP   Recent Labs   Lab  09/14/18   0418  09/15/18   0606   NA  136  136   K  4.2  4.3   CL  97  96   CO2  29  29   GLU  91  103   BUN  47*  53*   CREATININE  1.4  1.8*   CALCIUM  8.7  8.7   ANIONGAP  10  11   ESTGFRAFRICA  41*  30*   EGFRNONAA  36*  26*   , CBC   Recent Labs   Lab  09/14/18   0418  09/15/18   0605   WBC  11.50  9.70   HGB  8.9*  8.6*   HCT  29.1*  29.2*   PLT  327  292   , INR   Recent Labs   Lab  09/13/18   1848  09/14/18   0418  09/15/18   0605   INR  2.4*  2.2*  1.7*    and Troponin   Recent Labs   Lab  09/13/18 1848   TROPONINI  0.019       Significant Imaging: X-Ray: CXR: X-Ray Chest 1 View (CXR):   Results for orders placed or performed during the hospital encounter of 09/11/18   X-Ray Chest 1 View for PICC_Central line    Narrative    EXAMINATION:  XR CHEST 1 VIEW    CLINICAL HISTORY:  Evaluate PICC line placement;    TECHNIQUE:  Single frontal view of the chest was performed.    COMPARISON:  09/13/2018    FINDINGS:  A right upper extremity PICC line has its tip at the cavoatrial junction.  The heart is enlarged.  There is hazy opacification at the right lung base which may represent consolidation or layering pleural effusion.  A left shoulder arthroplasty is noted.      Impression    Appropriately positioned right PICC line.  Cardiomegaly.  Right basilar opacification representing consolidation and/or pleural effusion.      Electronically signed by: Terry Rivera MD  Date:    09/13/2018  Time:    18:00     Assessment and Plan:     Brief HPI:    Active Diagnoses:    Diagnosis Date Noted POA    PRINCIPAL PROBLEM:  Severe sepsis [A41.9, R65.20] 09/12/2018 Yes    History of pulmonary embolism [Z86.711] 09/12/2018 Yes    Acute cystitis with hematuria [N30.01] 09/12/2018 Yes    Essential hypertension  [I10] 09/12/2018 Yes    Chronic diastolic heart failure [I50.32] 09/12/2018 Yes    Acquired hypothyroidism [E03.9] 09/12/2018 Yes    Glaucoma [H40.9] 09/12/2018 Yes    Stage 2 chronic kidney disease [N18.2] 09/12/2018 Yes    Encephalopathy, metabolic [G93.41] 09/12/2018 Yes      Problems Resolved During this Admission:       VTE Risk Mitigation (From admission, onward)        Ordered     Place sequential compression device  Until discontinued      09/12/18 5781          Chema Lunsford MD  Cardiology  [facility]

## 2018-09-15 NOTE — PLAN OF CARE
Problem: Patient Care Overview  Goal: Plan of Care Review  Very edematous everywhere. Very hard to move self in bed or raise arms due to edema. Urine output poor, only 110 cc's this shift. Needs to be reoriented often. No bowel movement in a few days. Code status changed to DNR yesterday. Heart rate 40-60 and irregular.

## 2018-09-15 NOTE — PROGRESS NOTES
Progress Note  Hospital Medicine  Patient Name:Marjorie Macario  MRN:  [id number]  Patient Class: IP- Inpatient  Admit Date: 9/11/2018  Length of Stay: 3 days  Expected Discharge Date:   Attending Physician: Erin Silver MD  Primary Care Provider:  Sotero Le MD    SUBJECTIVE:     Principal Problem: Severe sepsis  Initial history of present illness: Pt was sent from [facility] for hematuria and altered mental status. Pt was difficult to arouse, now after IV fluids, awake and talking unintelligible. Unable to obtain history and ROS from pt due to encephalopathy. Records did not offer any up to date information. In July, pt was treated for septic shock due to UTI at [facility]. Pt required pressors along with mechanical ventilation at that time.     PMH/PSH/SH/FH/Meds: reviewed.    Symptoms/Review of Systems: In ICU, patient answers questions appropriately.  Hd stable and HR improved. BP low normal. Otherwise no other acute issues at this time.   Diet:  Adequate intake.    Activity level: Up with assist   Pain:  Patient reports no pain.       OBJECTIVE:   Vital Signs (Most Recent):      Temp: 98.6 °F (37 °C) (09/15/18 0000)  Pulse: 66 (09/15/18 0715)  Resp: 13 (09/15/18 0715)  BP: (!) 94/55 (09/15/18 0715)  SpO2: 95 % (09/15/18 0715)       Vital Signs Range (Last 24H):  Temp:  [97.2 °F (36.2 °C)-98.6 °F (37 °C)]   Pulse:  [43-66]   Resp:  [12-25]   BP: ()/(45-71)   SpO2:  [78 %-96 %]     Weight: 126.3 kg (278 lb 7.1 oz)  Body mass index is 44.94 kg/m².    Intake/Output Summary (Last 24 hours) at 9/15/2018 0827  Last data filed at 9/15/2018 0600  Gross per 24 hour   Intake 3100 ml   Output 160 ml   Net 2940 ml     Physical Examination:  Constitutional: She is oriented to person, place, and time. No distress.   HENT:   Head: Normocephalic and atraumatic.   Eyes: Conjunctivae are normal. Right eye exhibits no discharge. Left eye exhibits no discharge. No scleral icterus.   Neck: Normal range of motion. Neck supple.  No JVD present. No thyromegaly present.   Cardiovascular: Normal rate and regular rhythm. Exam reveals no gallop and no friction rub.   Murmur heard.  Pulmonary/Chest: Effort normal and breath sounds normal. No respiratory distress. She has no wheezes. She has no rales. She exhibits no tenderness.   Abdominal: Soft. Bowel sounds are normal. She exhibits no distension. There is no tenderness. There is no rebound and no guarding.   Musculoskeletal: She exhibits no edema or tenderness. 1+ pitting edema.  Lymphadenopathy:     She has no cervical adenopathy.   Neurological: She is alert and oriented to person, place, and time. No cranial nerve deficit.   Skin: Skin is dry. Capillary refill takes less than 2 seconds. She is not diaphoretic.   Cool to touch  Pretibial bandage removed-small ulcer with a clean base noted.     Psychiatric: She has a normal mood and affect. Her behavior is normal. Judgment and thought content normal.    CBC:  Recent Labs   Lab  09/13/18   0729  09/14/18   0418  09/15/18   0605   WBC  12.00  11.50  9.70   RBC  4.17  3.93*  3.91*   HGB  9.1*  8.9*  8.6*   HCT  31.4*  29.1*  29.2*   PLT  302  327  292   MCV  75*  74*  75*   MCH  21.7*  22.7*  21.9*   MCHC  28.9*  30.6*  29.4*   BMP  Recent Labs   Lab  09/13/18   0729  09/14/18   0418  09/15/18   0606   GLU  85  91  103   NA  138  136  136   K  4.4  4.2  4.3   CL  97  97  96   CO2  29  29  29   BUN  47*  47*  53*   CREATININE  1.1  1.4  1.8*   CALCIUM  9.1  8.7  8.7   MG  1.7  1.6  1.7      Diagnostic Results:  Microbiology Results (last 7 days)     Procedure Component Value Units Date/Time    Blood culture x two cultures. Draw prior to antibiotics. [[id number]] Collected:  09/11/18 2151    Order Status:  Completed Specimen:  Blood Updated:  09/15/18 0612     Blood Culture, Routine No Growth to date     Blood Culture, Routine No Growth to date     Blood Culture, Routine No Growth to date     Blood Culture, Routine No Growth to date    Narrative:        Aerobic and anaerobic    Blood culture x two cultures. Draw prior to antibiotics. [[id number]] Collected:  09/11/18 2151    Order Status:  Completed Specimen:  Blood Updated:  09/15/18 0612     Blood Culture, Routine No Growth to date     Blood Culture, Routine No Growth to date     Blood Culture, Routine No Growth to date     Blood Culture, Routine No Growth to date    Narrative:       Aerobic and anaerobic    Urine culture [[id number]]  (Susceptibility) Collected:  09/11/18 2200    Order Status:  Completed Specimen:  Urine Updated:  09/14/18 1323     Urine Culture, Routine --     ESCHERICHIA COLI  50,000 - 99,999 cfu/ml      Narrative:       Preferred Collection Type->Urine, Catheterized         CXR: Perihilar and basal pleural and parenchymal changes.  Asymmetric CHF and pneumonia could both yield this appearance.    CT urogram:  Small volume of ascites, small bilateral pleural effusions and posterior bibasilar atelectasis.  Extensive subcutaneous fat stranding and fluid.  Diverticulosis without CT findings of acute diverticulitis.  Renal scarring.  Additional findings as detailed above including IVC filter, osseous degenerative change.    CXR:   Appropriately positioned right PICC line.  Cardiomegaly.  Right basilar opacification representing consolidation and/or pleural effusion.    Assessment/Plan:     * Severe sepsis     Secondary to UTI.  Associated with lactic acidosis and leukocytosis-no resolved.   Continue Cefepime.  Stop maintenance fluids       Encephalopathy, metabolic     Acute  Secondary to UTI  Mild improvement after IV fluids  Continue to monitor closely        Stage 2 chronic kidney disease     Creatinine at baseline  Will trend daily        Glaucoma     Chronic, stable  Continue BB eye gtts       Acquired hypothyroidism     Chronic  Continue Synthroid       Chronic diastolic heart failure     No evidence of exacerbation   Hold diuretics for now       Bradycardia   Atrial fibrillation with slow  ventricular response.  Off BB and Digoxin. Follow cardiology recommendations.     Essential hypertension     Currently on low side of normal   Hold anti-hypertensive's   Monitor and treat accordingly        Acute cystitis with hematuria     Empiric coverage with cefepime  Dr. Holman is following, intermittent bladder irrigation, outpatient cystoscopy planned.  Stop Apixaban         History of pulmonary embolism     Stop Apixaban now due to abnormal bleeding      DVT prophylaxis: Use SCD and TEDs. Apixaban is on hold due to bleeding/hematuria.    Code status: DNR.      Reinaldo Chaparro MD  Department of Hospital Medicine   [facility]

## 2018-09-16 LAB
ANION GAP SERPL CALC-SCNC: 11 MMOL/L
ANISOCYTOSIS BLD QL SMEAR: SLIGHT
BASOPHILS # BLD AUTO: ABNORMAL K/UL
BASOPHILS NFR BLD: 4 %
BUN SERPL-MCNC: 55 MG/DL
CALCIUM SERPL-MCNC: 8.3 MG/DL
CHLORIDE SERPL-SCNC: 98 MMOL/L
CK MB SERPL-MCNC: 1.9 NG/ML
CK MB SERPL-MCNC: 2 NG/ML
CK MB SERPL-MCNC: 2.3 NG/ML
CK MB SERPL-RTO: 7.4 %
CK MB SERPL-RTO: 8.5 %
CK MB SERPL-RTO: 9.5 %
CK SERPL-CCNC: 20 U/L
CK SERPL-CCNC: 20 U/L
CK SERPL-CCNC: 27 U/L
CO2 SERPL-SCNC: 27 MMOL/L
CREAT SERPL-MCNC: 1.6 MG/DL
DIFFERENTIAL METHOD: ABNORMAL
EOSINOPHIL # BLD AUTO: ABNORMAL K/UL
EOSINOPHIL NFR BLD: 2 %
ERYTHROCYTE [DISTWIDTH] IN BLOOD BY AUTOMATED COUNT: 23.5 %
EST. GFR  (AFRICAN AMERICAN): 35 ML/MIN/1.73 M^2
EST. GFR  (NON AFRICAN AMERICAN): 30 ML/MIN/1.73 M^2
GIANT PLATELETS BLD QL SMEAR: PRESENT
GLUCOSE SERPL-MCNC: 96 MG/DL
HCT VFR BLD AUTO: 28 %
HGB BLD-MCNC: 8.3 G/DL
HYPOCHROMIA BLD QL SMEAR: ABNORMAL
INR PPP: 1.8
LYMPHOCYTES # BLD AUTO: ABNORMAL K/UL
LYMPHOCYTES NFR BLD: 5 %
MAGNESIUM SERPL-MCNC: 1.6 MG/DL
MCH RBC QN AUTO: 21.8 PG
MCHC RBC AUTO-ENTMCNC: 29.5 G/DL
MCV RBC AUTO: 74 FL
METAMYELOCYTES NFR BLD MANUAL: 2 %
MONOCYTES # BLD AUTO: ABNORMAL K/UL
MONOCYTES NFR BLD: 7 %
NEUTROPHILS NFR BLD: 78 %
NEUTS BAND NFR BLD MANUAL: 2 %
NRBC BLD-RTO: 1 /100 WBC
OVALOCYTES BLD QL SMEAR: ABNORMAL
PHOSPHATE SERPL-MCNC: 3.7 MG/DL
PLATELET # BLD AUTO: 297 K/UL
PLATELET BLD QL SMEAR: ABNORMAL
PMV BLD AUTO: 9.3 FL
POCT GLUCOSE: 101 MG/DL (ref 70–110)
POCT GLUCOSE: 131 MG/DL (ref 70–110)
POCT GLUCOSE: 96 MG/DL (ref 70–110)
POIKILOCYTOSIS BLD QL SMEAR: SLIGHT
POLYCHROMASIA BLD QL SMEAR: ABNORMAL
POTASSIUM SERPL-SCNC: 4.1 MMOL/L
PROTHROMBIN TIME: 17.8 SEC
RBC # BLD AUTO: 3.81 M/UL
SODIUM SERPL-SCNC: 136 MMOL/L
TARGETS BLD QL SMEAR: ABNORMAL
WBC # BLD AUTO: 8.7 K/UL

## 2018-09-16 PROCEDURE — 83735 ASSAY OF MAGNESIUM: CPT

## 2018-09-16 PROCEDURE — A4216 STERILE WATER/SALINE, 10 ML: HCPCS | Performed by: INTERNAL MEDICINE

## 2018-09-16 PROCEDURE — 85610 PROTHROMBIN TIME: CPT

## 2018-09-16 PROCEDURE — 94640 AIRWAY INHALATION TREATMENT: CPT

## 2018-09-16 PROCEDURE — 25000003 PHARM REV CODE 250: Performed by: INTERNAL MEDICINE

## 2018-09-16 PROCEDURE — 63600175 PHARM REV CODE 636 W HCPCS: Performed by: NURSE PRACTITIONER

## 2018-09-16 PROCEDURE — 63600175 PHARM REV CODE 636 W HCPCS: Performed by: SPECIALIST

## 2018-09-16 PROCEDURE — 82550 ASSAY OF CK (CPK): CPT | Mod: 91

## 2018-09-16 PROCEDURE — 84100 ASSAY OF PHOSPHORUS: CPT

## 2018-09-16 PROCEDURE — 85007 BL SMEAR W/DIFF WBC COUNT: CPT

## 2018-09-16 PROCEDURE — 94761 N-INVAS EAR/PLS OXIMETRY MLT: CPT

## 2018-09-16 PROCEDURE — 36430 TRANSFUSION BLD/BLD COMPNT: CPT

## 2018-09-16 PROCEDURE — 25000242 PHARM REV CODE 250 ALT 637 W/ HCPCS: Performed by: INTERNAL MEDICINE

## 2018-09-16 PROCEDURE — 25000003 PHARM REV CODE 250: Performed by: NURSE PRACTITIONER

## 2018-09-16 PROCEDURE — 36415 COLL VENOUS BLD VENIPUNCTURE: CPT

## 2018-09-16 PROCEDURE — 20000000 HC ICU ROOM

## 2018-09-16 PROCEDURE — 85027 COMPLETE CBC AUTOMATED: CPT

## 2018-09-16 PROCEDURE — 80048 BASIC METABOLIC PNL TOTAL CA: CPT

## 2018-09-16 PROCEDURE — 27000221 HC OXYGEN, UP TO 24 HOURS

## 2018-09-16 PROCEDURE — 63600175 PHARM REV CODE 636 W HCPCS: Performed by: INTERNAL MEDICINE

## 2018-09-16 PROCEDURE — 82553 CREATINE MB FRACTION: CPT | Mod: 91

## 2018-09-16 PROCEDURE — 93005 ELECTROCARDIOGRAM TRACING: CPT

## 2018-09-16 RX ORDER — BISACODYL 10 MG
10 SUPPOSITORY, RECTAL RECTAL DAILY PRN
Status: DISCONTINUED | OUTPATIENT
Start: 2018-09-16 | End: 2018-09-24 | Stop reason: HOSPADM

## 2018-09-16 RX ORDER — FUROSEMIDE 10 MG/ML
40 INJECTION INTRAMUSCULAR; INTRAVENOUS 2 TIMES DAILY
Status: DISCONTINUED | OUTPATIENT
Start: 2018-09-16 | End: 2018-09-24 | Stop reason: HOSPADM

## 2018-09-16 RX ADMIN — CEFEPIME HYDROCHLORIDE 1 G: 1 INJECTION, POWDER, FOR SOLUTION INTRAMUSCULAR; INTRAVENOUS at 01:09

## 2018-09-16 RX ADMIN — DOBUTAMINE IN DEXTROSE 5 MCG/KG/MIN: 200 INJECTION, SOLUTION INTRAVENOUS at 06:09

## 2018-09-16 RX ADMIN — LEVOTHYROXINE SODIUM 25 MCG: 25 TABLET ORAL at 05:09

## 2018-09-16 RX ADMIN — DOBUTAMINE IN DEXTROSE 5 MCG/KG/MIN: 200 INJECTION, SOLUTION INTRAVENOUS at 05:09

## 2018-09-16 RX ADMIN — IPRATROPIUM BROMIDE AND ALBUTEROL SULFATE 3 ML: .5; 3 SOLUTION RESPIRATORY (INHALATION) at 07:09

## 2018-09-16 RX ADMIN — BISACODYL 10 MG: 10 SUPPOSITORY RECTAL at 01:09

## 2018-09-16 RX ADMIN — IPRATROPIUM BROMIDE AND ALBUTEROL SULFATE 3 ML: .5; 3 SOLUTION RESPIRATORY (INHALATION) at 12:09

## 2018-09-16 RX ADMIN — Medication 10 ML: at 12:09

## 2018-09-16 RX ADMIN — PANTOPRAZOLE SODIUM 40 MG: 40 TABLET, DELAYED RELEASE ORAL at 09:09

## 2018-09-16 RX ADMIN — IPRATROPIUM BROMIDE AND ALBUTEROL SULFATE 3 ML: .5; 3 SOLUTION RESPIRATORY (INHALATION) at 01:09

## 2018-09-16 RX ADMIN — Medication 10 ML: at 05:09

## 2018-09-16 RX ADMIN — TRAMADOL HYDROCHLORIDE 50 MG: 50 TABLET, FILM COATED ORAL at 03:09

## 2018-09-16 RX ADMIN — FUROSEMIDE 40 MG: 10 INJECTION, SOLUTION INTRAVENOUS at 08:09

## 2018-09-16 RX ADMIN — LATANOPROST 1 DROP: 50 SOLUTION OPHTHALMIC at 09:09

## 2018-09-16 RX ADMIN — TIMOLOL MALEATE 1 DROP: 5 SOLUTION OPHTHALMIC at 08:09

## 2018-09-16 RX ADMIN — CEFEPIME HYDROCHLORIDE 1 G: 1 INJECTION, POWDER, FOR SOLUTION INTRAMUSCULAR; INTRAVENOUS at 03:09

## 2018-09-16 RX ADMIN — FUROSEMIDE 40 MG: 10 INJECTION, SOLUTION INTRAVENOUS at 05:09

## 2018-09-16 RX ADMIN — DOBUTAMINE IN DEXTROSE 5 MCG/KG/MIN: 200 INJECTION, SOLUTION INTRAVENOUS at 07:09

## 2018-09-16 RX ADMIN — IPRATROPIUM BROMIDE AND ALBUTEROL SULFATE 3 ML: .5; 3 SOLUTION RESPIRATORY (INHALATION) at 06:09

## 2018-09-16 NOTE — PROGRESS NOTES
Progress Note  Hospital Medicine  Patient Name:Marjorie Macario  MRN:  [id number]  Patient Class: IP- Inpatient  Admit Date: 9/11/2018  Length of Stay: 4 days  Expected Discharge Date:   Attending Physician: Erin Silver MD  Primary Care Provider:  Sotero Le MD    SUBJECTIVE:     Principal Problem: Severe sepsis  Initial history of present illness: Pt was sent from [facility] for hematuria and altered mental status. Pt was difficult to arouse, now after IV fluids, awake and talking unintelligible. Unable to obtain history and ROS from pt due to encephalopathy. Records did not offer any up to date information. In July, pt was treated for septic shock due to UTI at [facility]. Pt required pressors along with mechanical ventilation at that time.     PMH/PSH/SH/FH/Meds: reviewed.    Symptoms/Review of Systems: In ICU seen and examined. Mentating at baseline.  Issues with severe hypotension yesterday although clinical the same. Started on Dobutamine and Jeronimo by cardiology. HD stable this morning otherwise. Remains on abx.   Diet:  Adequate intake.    Activity level: Up with assist   Pain:  Patient reports no pain.       OBJECTIVE:   Vital Signs (Most Recent):      Temp: 98 °F (36.7 °C) (09/16/18 0400)  Pulse: 101 (09/16/18 0739)  Resp: 16 (09/16/18 0739)  BP: 108/63 (09/16/18 0530)  SpO2: 96 % (09/16/18 0739)       Vital Signs Range (Last 24H):  Temp:  [97.3 °F (36.3 °C)-98 °F (36.7 °C)]   Pulse:  []   Resp:  [12-22]   BP: ()/(11-91)   SpO2:  [53 %-98 %]     Weight: 126.3 kg (278 lb 7.1 oz)  Body mass index is 44.94 kg/m².    Intake/Output Summary (Last 24 hours) at 9/16/2018 0859  Last data filed at 9/16/2018 0523  Gross per 24 hour   Intake 2251.11 ml   Output 515 ml   Net 1736.11 ml     Physical Examination:  Constitutional: She is oriented to person, place, and time. No distress.   HENT:   Head: Normocephalic and atraumatic.   Eyes: Conjunctivae are normal. Right eye exhibits no discharge. Left eye exhibits  no discharge. No scleral icterus.   Neck: Normal range of motion. Neck supple. No JVD present. No thyromegaly present.   Cardiovascular: Normal rate and regular rhythm. Exam reveals no gallop and no friction rub.   Murmur heard.  Pulmonary/Chest: Effort normal and breath sounds normal. No respiratory distress. She has no wheezes. She has no rales. She exhibits no tenderness.   Abdominal: Soft. Bowel sounds are normal. She exhibits no distension. There is no tenderness. There is no rebound and no guarding.   Musculoskeletal: She exhibits no edema or tenderness. 1+ pitting edema.  Lymphadenopathy:     She has no cervical adenopathy.   Neurological: She is alert and oriented to person, place, and time. No cranial nerve deficit.   Skin: Skin is dry. Capillary refill takes less than 2 seconds. She is not diaphoretic.   Cool to touch  Pretibial bandage removed-small ulcer with a clean base noted.     Psychiatric: She has a normal mood and affect. Her behavior is normal. Judgment and thought content normal.    CBC:  Recent Labs   Lab  09/14/18   0418  09/15/18   0605  09/16/18   0337   WBC  11.50  9.70  8.70   RBC  3.93*  3.91*  3.81*   HGB  8.9*  8.6*  8.3*   HCT  29.1*  29.2*  28.0*   PLT  327  292  297   MCV  74*  75*  74*   MCH  22.7*  21.9*  21.8*   MCHC  30.6*  29.4*  29.5*   BMP  Recent Labs   Lab  09/14/18   0418  09/15/18   0606  09/16/18   0337   GLU  91  103  96   NA  136  136  136   K  4.2  4.3  4.1   CL  97  96  98   CO2  29  29  27   BUN  47*  53*  55*   CREATININE  1.4  1.8*  1.6*   CALCIUM  8.7  8.7  8.3*   MG  1.6  1.7  1.6      Diagnostic Results:  Microbiology Results (last 7 days)     Procedure Component Value Units Date/Time    Blood culture x two cultures. Draw prior to antibiotics. [[id number]] Collected:  09/11/18 3901    Order Status:  Completed Specimen:  Blood Updated:  09/16/18 0612     Blood Culture, Routine No Growth to date     Blood Culture, Routine No Growth to date     Blood Culture,  Routine No Growth to date     Blood Culture, Routine No Growth to date     Blood Culture, Routine No Growth to date    Narrative:       Aerobic and anaerobic    Blood culture x two cultures. Draw prior to antibiotics. [[id number]] Collected:  09/11/18 2151    Order Status:  Completed Specimen:  Blood Updated:  09/16/18 0612     Blood Culture, Routine No Growth to date     Blood Culture, Routine No Growth to date     Blood Culture, Routine No Growth to date     Blood Culture, Routine No Growth to date     Blood Culture, Routine No Growth to date    Narrative:       Aerobic and anaerobic    Urine culture [[id number]]  (Susceptibility) Collected:  09/11/18 2200    Order Status:  Completed Specimen:  Urine Updated:  09/14/18 1323     Urine Culture, Routine --     ESCHERICHIA COLI  50,000 - 99,999 cfu/ml      Narrative:       Preferred Collection Type->Urine, Catheterized         CXR: Perihilar and basal pleural and parenchymal changes.  Asymmetric CHF and pneumonia could both yield this appearance.    CT urogram:  Small volume of ascites, small bilateral pleural effusions and posterior bibasilar atelectasis.  Extensive subcutaneous fat stranding and fluid.  Diverticulosis without CT findings of acute diverticulitis.  Renal scarring.  Additional findings as detailed above including IVC filter, osseous degenerative change.    CXR:   Appropriately positioned right PICC line.  Cardiomegaly.  Right basilar opacification representing consolidation and/or pleural effusion.    Assessment/Plan:     * Severe sepsis     Secondary to UTI.  Associated with lactic acidosis and leukocytosis-now resolved.   Continue Cefepime.  Stop maintenance fluids  Remains with labile blood pressures although no evidence of worsening infection and antibitoics are culture appropriate. CXR yesterday unchanged with no new focal processes.        Encephalopathy, metabolic     Acute  Secondary to UTI  Mild improvement after IV fluids  Continue to monitor  closely        Stage 2 chronic kidney disease     Creatinine at baseline  Will trend daily        Glaucoma     Chronic, stable  Continue BB eye gtts       Acquired hypothyroidism     Chronic  Continue Synthroid       Chronic diastolic heart failure     No evidence of exacerbation   Total body volume overloaded however  Lasix 40 BID      Bradycardia   Atrial fibrillation with slow ventricular response.  Off BB and Digoxin. Follow cardiology recommendations.  Started on juan jose and dobutamine     Essential hypertension     Currently on low side of normal- as above  Hold anti-hypertensive's   Monitor and treat accordingly        Acute cystitis with hematuria     Empiric coverage with cefepime  Dr. Holman is following, intermittent bladder irrigation, outpatient cystoscopy planned.  Stop Apixaban         History of pulmonary embolism     Stop Apixaban now due to abnormal bleeding      DVT prophylaxis: Use SCD and TEDs. Apixaban is on hold due to bleeding/hematuria.    Code status: DNR.      Reinaldo Chaparro MD  Department of Hospital Medicine   [facility]

## 2018-09-16 NOTE — CONSULTS
HISTORY OF PRESENT ILLNESS:  This 78-year-old lady is admitted with urinary   tract infection and altered mental status.    The patient is a resident of [facility].  The patient's mental status   apparently deteriorated at [facility] and the patient was transferred here.  She   was diagnosed with a urinary tract infection.  She was apparently confused.    This morning, the patient is quite lucid.  She is a very poor historian.    Information is obtained from the patient's daughter at the bedside.  Earlier   today, the patient was not arousable.  She has apparently been made a DNR.    The patient [facility] when her pCO2 was in the 100 range.  The patient   is intubated and was placed on the ventilator.  In June 2018, the patient was   admitted with hypovolemic/septic shock and was treated with pressors.    Echocardiogram has revealed LVEF in the 60% range.  The right atrium and right   ventricle are markedly dilated, the septum was flattened suggestive of RV volume   overload.  The inferior vena cava was markedly dilated and PA systolic pressure   was estimated at 48 mmHg.  The patient is on home oxygen 24/7.  She is very   sedentary.  She ambulates a little using a walker at [facility].  The patient also   has a history of pulmonary embolism and has an inferior vena cava filter.  She   has sleep apnea and is on CPAP.    The patient has had transient sinus bradycardia with first-degree AV block   during her July admit.  The patient apparently has a history of paroxysmal   atrial fibrillation additionally.  Heart rate at admit was 60 BPM.  ECG on   09/13/2018 reveals severe sinus bradycardia at 31 BPM.  Serum digoxin level is   1.8.  Digoxin has been discontinued.    PAST HISTORY:  The patient has chronic edema of the lower extremities with   blisters and weeping lesions as well as cellulitis.  Cataract extraction and   intraocular  lenses.  IVC filter.  Hypertension since 2000.  Nephrolithiasis   2006.  Pulmonary embolism in 2013.  Sinusitis.  Knee arthroplasty in 2008.    SOCIAL HISTORY:  The patient used to smoke a pack of cigarettes for   approximately 5 years; she stopped smoking 55 years ago.  She does not drink any   alcohol.    MEDICATIONS:  Acetaminophen 650 mg every 6 hours p.r.n., DuoNeb every 6 hours,   apixaban 5 mg b.i.d., aspirin 81 mg daily, digoxin 0.25 mg daily, Vibramycin 100   mg b.i.d., furosemide 60 mg b.i.d., Acidophilus, Latanoprost 0.005% b.i.d.,   Synthroid 25 mcg daily, metoprolol 100 mg daily, Timolol 0.5% drops two drops in   both eyes b.i.d., tramadol 50 mg every 6 hours.    PHYSICAL EXAMINATION:  GENERAL:  This is a well-built, morbidly obese white lady in no acute distress   this morning.  EYES:  No conjunctival pallor or scleral icterus.  THROAT:  No masses are observed.  NECK:  Jugular venous pressure appears to be elevated.  Hepatojugular reflex is   positive.  Carotids are without bruits.  CHEST:  Air entry is equal bilaterally.  There were no rales or rhonchi.  HEART:  Occasional irregularity is noted.  There were no murmurs, gallops or   rubs.  Marked bilateral lower extremity pitting edema is noted.  ABDOMEN:  Morbidly obese.  Liver edge is not palpable.  EXTREMITIES:  Bilateral severe edema.    LABORATORY DATA:  Hemoglobin 8.9, hematocrit 29.1, WBC count 11,500, and   platelet count 327,000, granulocytes 80%.  PT 22.1, INR 2.2.  Sodium 126,   potassium 4.2, BUN 47, creatinine 1.4, total protein 7.8, albumin 2.8, AST 51,   ALT 25.  Serial troponins are within normal limits.  CPKs are all normal in the   50s and 70s with MB of 6.4, 6.8, and 7.1%.  Serum digoxin level is 1.8.  Chest   x-ray reveals probable right pleural effusion and/or atelectasis.  The right   hemidiaphragm is obscure.  Possible left pleural effusion.  A PICC line is   noted.  Left shoulder total arthroplasty.  EKGs were reviewed.  The  patient has   had episodes of isorhythmic AV dissociation.  Presently, she appears to be in   sinus bradycardia at 45 BPM.    IMPRESSION:  1.  Sepsis and urinary tract infections; mental status change; lactic acidosis,   and leukocytosis  2.  Metabolic encephalopathy.  3.  Massive lower extremity edema; probable predominant right heart failure;   pleural effusions, heart failure, preserved ejection fraction, hypertension.  4.  History of pulmonary embolism and inferior vena cava filter.  5.  Prior urinary tract infection and septic shock; lower extremity cellulitis   in the past.    Digoxin will be discontinued.  Beta-blockers are also be held.  We might also   need to hold the timolol, which she uses for glaucoma.  She is presently a DNR.    A permanent pacemaker would be of consideration if she continues to be   bradycardic off beta-blocker and digoxin.      MT/FACUNDO  dd: 09/14/2018 13:05:23 (CDT)  td: 09/15/2018 02:30:19 (CDT)  Doc ID   #[id number]  Job ID #[id number]    CC:

## 2018-09-16 NOTE — PLAN OF CARE
09/16/18 1856   Patient Assessment/Suction   Level of Consciousness (AVPU) alert   Respiratory Effort Normal;Unlabored   Expansion/Accessory Muscles/Retractions no use of accessory muscles   All Lung Fields Breath Sounds diminished   Rhythm/Pattern, Respiratory depth regular;pattern regular;unlabored   PRE-TX-O2-ETCO2   O2 Device (Oxygen Therapy) nasal cannula   $ Is the patient on Low Flow Oxygen? Yes   Flow (L/min) 6   SpO2 97 %   Pulse Oximetry Type Intermittent   $ Pulse Oximetry - Multiple Charge Pulse Oximetry - Multiple   Pulse 96   Resp 19   Aerosol Therapy   $ Aerosol Therapy Charges Aerosol Treatment   Respiratory Treatment Status given   SVN/Inhaler Treatment Route mask   Position During Treatment HOB at 45 degrees   Patient Tolerance good   Post-Treatment   Post-treatment Heart Rate (beats/min) 84   Post-treatment Resp Rate (breaths/min) 12   All Fields Breath Sounds aeration increased

## 2018-09-16 NOTE — PLAN OF CARE
09/16/18 0010   Patient Assessment/Suction   Level of Consciousness (AVPU) alert   Respiratory Effort Normal;Unlabored   Expansion/Accessory Muscles/Retractions no use of accessory muscles   All Lung Fields Breath Sounds diminished;wheezes, expiratory   Rhythm/Pattern, Respiratory depth regular;pattern regular;unlabored   PRE-TX-O2-ETCO2   O2 Device (Oxygen Therapy) nasal cannula   $ Is the patient on Low Flow Oxygen? Yes   Flow (L/min) 6   SpO2 96 %   Pulse Oximetry Type Continuous   $ Pulse Oximetry - Multiple Charge Pulse Oximetry - Multiple   Pulse 74   Resp 14   Aerosol Therapy   $ Aerosol Therapy Charges Aerosol Treatment   Respiratory Treatment Status given   SVN/Inhaler Treatment Route mask   Position During Treatment HOB at 45 degrees   Patient Tolerance good   Post-Treatment   Post-treatment Heart Rate (beats/min) 77   Post-treatment Resp Rate (breaths/min) 22   All Fields Breath Sounds aeration increased

## 2018-09-16 NOTE — PLAN OF CARE
Problem: Patient Care Overview  Goal: Plan of Care Review  Outcome: Ongoing (interventions implemented as appropriate)  Ensured patient safety with frequent checks, assessing pain and neuro monitoring ams. Patient remains aaox3 and intermittent confusion. Patient remains with picc in [place] and piv sl in the rle. Patient remains 3-4+ edema generalized and skin remains intact. Remains free of fall or injury. Will continue to monitor.

## 2018-09-16 NOTE — PLAN OF CARE
Problem: Patient Care Overview  Goal: Plan of Care Review  Outcome: Ongoing (interventions implemented as appropriate)  Pt remains in ICU on Dobutrex at 5 mcg/kg/min. Jeronimo stopped at 0935. No epidsodes of bradycardia or hypotension this shift. Pt disoriented to place, time and situation. Mood labile, drowsy this AM and restless in the afternoon. Had big BM this shift. Pt has +4 pitting edema to arms and legs with weeping to right arm. Started on 40 mg of IV Lasix BID. 800 ml urine output this shift. Eating about 25% of each meal. Reviewed plan of care with son. Safety maintained.

## 2018-09-16 NOTE — PROGRESS NOTES
[facility]  Cardiology  Progress Note    Patient Name: Marjorie Macario  MRN: [id number]  Admission Date: 9/11/2018  Hospital Length of Stay: 4 days  Code Status: DNR   Attending Physician: Erin Silver MD   Primary Care Physician: Sotero Le MD  Expected Discharge Date:   Principal Problem:Severe sepsis    Subjective:     Hospital Course: had hypotension do  to hypovolemia on dobutsmine and receive 1,5 liter ivf  Interval History: afib stable, has rigth side failure    Review of Systems   Constitution: Positive for malaise/fatigue.   Eyes: Negative.    Cardiovascular: Positive for irregular heartbeat.   Respiratory: Positive for cough.    Endocrine: Negative.    Skin: Negative.    Musculoskeletal: Negative.    Gastrointestinal: Negative.    Neurological: Negative.      Objective:     Vital Signs (Most Recent):  Temp: 97.6 °F (36.4 °C) (09/16/18 0830)  Pulse: 77 (09/16/18 1100)  Resp: 14 (09/16/18 1100)  BP: (!) 115/59 (09/16/18 1100)  SpO2: (!) 94 % (09/16/18 1100) Vital Signs (24h Range):  Temp:  [97.3 °F (36.3 °C)-98 °F (36.7 °C)] 97.6 °F (36.4 °C)  Pulse:  [] 77  Resp:  [12-22] 14  SpO2:  [53 %-98 %] 94 %  BP: ()/(11-91) 115/59     Weight: 126.3 kg (278 lb 7.1 oz)  Body mass index is 44.94 kg/m².    SpO2: (!) 94 %  O2 Device (Oxygen Therapy): nasal cannula      Intake/Output Summary (Last 24 hours) at 9/16/2018 1206  Last data filed at 9/16/2018 0930  Gross per 24 hour   Intake 2136.11 ml   Output 720 ml   Net 1416.11 ml       Lines/Drains/Airways     Peripherally Inserted Central Catheter Line                 PICC Double Lumen 09/13/18 1645 right basilic 2 days          Drain                 Urethral Catheter 09/12/18 0020 4 days          Peripheral Intravenous Line                 Peripheral IV - Single Lumen 09/12/18 0103 Right Forearm 4 days                Physical Exam   Constitutional: She appears well-developed and well-nourished.   Eyes: Pupils are equal, round, and  reactive to light.   Neck: Normal range of motion.   Cardiovascular: An irregularly irregular rhythm present.   Abdominal: Soft. Normal appearance.       Significant Labs:   ABG: No results for input(s): PH, PCO2, HCO3, POCSATURATED, BE in the last 48 hours., Blood Culture: No results for input(s): LABBLOO in the last 48 hours., CMP   Recent Labs   Lab  09/15/18   0606  09/16/18   0337   NA  136  136   K  4.3  4.1   CL  96  98   CO2  29  27   GLU  103  96   BUN  53*  55*   CREATININE  1.8*  1.6*   CALCIUM  8.7  8.3*   ANIONGAP  11  11   ESTGFRAFRICA  30*  35*   EGFRNONAA  26*  30*   , CBC   Recent Labs   Lab  09/15/18   0605 09/16/18   0337   WBC  9.70  8.70   HGB  8.6*  8.3*   HCT  29.2*  28.0*   PLT  292  297   , INR   Recent Labs   Lab  09/15/18   0605  09/16/18   0337   INR  1.7*  1.8*    and Troponin No results for input(s): TROPONINI in the last 48 hours.    Significant Imaging: X-Ray: CXR: X-Ray Chest 1 View (CXR):   Results for orders placed or performed during the hospital encounter of 09/11/18   X-Ray Chest 1 View    Narrative    EXAMINATION:  XR CHEST 1 VIEW    CLINICAL HISTORY:  cough;    TECHNIQUE:  Single frontal view of the chest was performed.    COMPARISON:  Chest x-ray 09/13/2018.    FINDINGS:  There is again cardiomegaly and interstitial edema consistent with congestive heart failure.  This is not significantly changed over the interval.  There are persistent small bilateral pleural effusions with atelectasis versus infiltrate at the lung bases.    Right subclavian PICC line remains in satisfactory position.      Impression    1. Persistent congestive heart failure not significantly changed over the interval.  2. Persistent small bilateral pleural effusions with atelectasis versus infiltrate at the lung bases.  3. Right subclavian PICC line.      Electronically signed by: Terry Quintana  Date:    09/15/2018  Time:    13:06     Assessment and Plan:     Brief HPI:     Active Diagnoses:    Diagnosis  Date Noted POA    PRINCIPAL PROBLEM:  Severe sepsis [A41.9, R65.20] 09/12/2018 Yes    History of pulmonary embolism [Z86.711] 09/12/2018 Yes    Acute cystitis with hematuria [N30.01] 09/12/2018 Yes    Essential hypertension [I10] 09/12/2018 Yes    Chronic diastolic heart failure [I50.32] 09/12/2018 Yes    Acquired hypothyroidism [E03.9] 09/12/2018 Yes    Glaucoma [H40.9] 09/12/2018 Yes    Stage 2 chronic kidney disease [N18.2] 09/12/2018 Yes    Encephalopathy, metabolic [G93.41] 09/12/2018 Yes      Problems Resolved During this Admission:       VTE Risk Mitigation (From admission, onward)        Ordered     Place sequential compression device  Until discontinued      09/12/18 0013          Chema Lunsford MD  Cardiology  [facility]

## 2018-09-16 NOTE — PLAN OF CARE
09/15/18 1929   Patient Assessment/Suction   Level of Consciousness (AVPU) alert   Respiratory Effort Normal;Unlabored   Expansion/Accessory Muscles/Retractions no use of accessory muscles   RUL Breath Sounds wheezes, expiratory   RML Breath Sounds wheezes, expiratory   PRE-TX-O2-ETCO2   O2 Device (Oxygen Therapy) nasal cannula   $ Is the patient on Low Flow Oxygen? Yes   Flow (L/min) 6   Pulse 75   Resp 15   Aerosol Therapy   $ Aerosol Therapy Charges Aerosol Treatment   Respiratory Treatment Status given   SVN/Inhaler Treatment Route mask   Position During Treatment HOB at 45 degrees   Patient Tolerance good   Post-Treatment   Post-treatment Heart Rate (beats/min) 75   Post-treatment Resp Rate (breaths/min) 16   All Fields Breath Sounds aeration increased

## 2018-09-16 NOTE — PLAN OF CARE
Problem: Patient Care Overview  Goal: Plan of Care Review  Outcome: Ongoing (interventions implemented as appropriate)  Pt on 6L NC with Q6 duoneb treatments.

## 2018-09-17 LAB
ANION GAP SERPL CALC-SCNC: 10 MMOL/L
BACTERIA BLD CULT: NORMAL
BACTERIA BLD CULT: NORMAL
BASOPHILS # BLD AUTO: ABNORMAL K/UL
BASOPHILS NFR BLD: 0 %
BUN SERPL-MCNC: 51 MG/DL
CALCIUM SERPL-MCNC: 8.5 MG/DL
CHLORIDE SERPL-SCNC: 97 MMOL/L
CK MB SERPL-MCNC: 1.9 NG/ML
CK MB SERPL-MCNC: 2.1 NG/ML
CK MB SERPL-RTO: 10 %
CK MB SERPL-RTO: 8.4 %
CK SERPL-CCNC: 19 U/L
CK SERPL-CCNC: 19 U/L
CK SERPL-CCNC: 25 U/L
CK SERPL-CCNC: 25 U/L
CK SERPL-CCNC: 30 U/L
CO2 SERPL-SCNC: 29 MMOL/L
CREAT SERPL-MCNC: 1.4 MG/DL
DIFFERENTIAL METHOD: ABNORMAL
EOSINOPHIL # BLD AUTO: ABNORMAL K/UL
EOSINOPHIL NFR BLD: 1 %
ERYTHROCYTE [DISTWIDTH] IN BLOOD BY AUTOMATED COUNT: 22.4 %
EST. GFR  (AFRICAN AMERICAN): 41 ML/MIN/1.73 M^2
EST. GFR  (NON AFRICAN AMERICAN): 36 ML/MIN/1.73 M^2
GLUCOSE SERPL-MCNC: 161 MG/DL
HCT VFR BLD AUTO: 27.6 %
HGB BLD-MCNC: 8.3 G/DL
INR PPP: 1.6
LYMPHOCYTES # BLD AUTO: ABNORMAL K/UL
LYMPHOCYTES NFR BLD: 8 %
MAGNESIUM SERPL-MCNC: 1.6 MG/DL
MCH RBC QN AUTO: 21.7 PG
MCHC RBC AUTO-ENTMCNC: 29.9 G/DL
MCV RBC AUTO: 72 FL
MONOCYTES # BLD AUTO: ABNORMAL K/UL
MONOCYTES NFR BLD: 4 %
NEUTROPHILS NFR BLD: 84 %
NEUTS BAND NFR BLD MANUAL: 3 %
NRBC BLD-RTO: 4 /100 WBC
PHOSPHATE SERPL-MCNC: 3.1 MG/DL
PLATELET # BLD AUTO: 269 K/UL
PLATELET BLD QL SMEAR: ABNORMAL
PMV BLD AUTO: 9 FL
POCT GLUCOSE: 100 MG/DL (ref 70–110)
POCT GLUCOSE: 105 MG/DL (ref 70–110)
POCT GLUCOSE: 111 MG/DL (ref 70–110)
POCT GLUCOSE: 115 MG/DL (ref 70–110)
POCT GLUCOSE: 122 MG/DL (ref 70–110)
POTASSIUM SERPL-SCNC: 4 MMOL/L
PROTHROMBIN TIME: 16.6 SEC
RBC # BLD AUTO: 3.81 M/UL
SODIUM SERPL-SCNC: 136 MMOL/L
WBC # BLD AUTO: 9.1 K/UL

## 2018-09-17 PROCEDURE — 85027 COMPLETE CBC AUTOMATED: CPT

## 2018-09-17 PROCEDURE — 99233 SBSQ HOSP IP/OBS HIGH 50: CPT | Mod: ,,, | Performed by: INTERNAL MEDICINE

## 2018-09-17 PROCEDURE — A4216 STERILE WATER/SALINE, 10 ML: HCPCS | Performed by: INTERNAL MEDICINE

## 2018-09-17 PROCEDURE — 63600175 PHARM REV CODE 636 W HCPCS: Performed by: NURSE PRACTITIONER

## 2018-09-17 PROCEDURE — 94640 AIRWAY INHALATION TREATMENT: CPT

## 2018-09-17 PROCEDURE — 84100 ASSAY OF PHOSPHORUS: CPT

## 2018-09-17 PROCEDURE — 63600175 PHARM REV CODE 636 W HCPCS: Performed by: INTERNAL MEDICINE

## 2018-09-17 PROCEDURE — 82550 ASSAY OF CK (CPK): CPT

## 2018-09-17 PROCEDURE — 85610 PROTHROMBIN TIME: CPT

## 2018-09-17 PROCEDURE — 82550 ASSAY OF CK (CPK): CPT | Mod: 91

## 2018-09-17 PROCEDURE — 83735 ASSAY OF MAGNESIUM: CPT

## 2018-09-17 PROCEDURE — 36415 COLL VENOUS BLD VENIPUNCTURE: CPT

## 2018-09-17 PROCEDURE — 27000221 HC OXYGEN, UP TO 24 HOURS

## 2018-09-17 PROCEDURE — 82553 CREATINE MB FRACTION: CPT

## 2018-09-17 PROCEDURE — 85007 BL SMEAR W/DIFF WBC COUNT: CPT

## 2018-09-17 PROCEDURE — 20000000 HC ICU ROOM

## 2018-09-17 PROCEDURE — 25000242 PHARM REV CODE 250 ALT 637 W/ HCPCS: Performed by: INTERNAL MEDICINE

## 2018-09-17 PROCEDURE — 94761 N-INVAS EAR/PLS OXIMETRY MLT: CPT

## 2018-09-17 PROCEDURE — 63600175 PHARM REV CODE 636 W HCPCS: Performed by: SPECIALIST

## 2018-09-17 PROCEDURE — 25000003 PHARM REV CODE 250: Performed by: NURSE PRACTITIONER

## 2018-09-17 PROCEDURE — 25000003 PHARM REV CODE 250: Performed by: INTERNAL MEDICINE

## 2018-09-17 PROCEDURE — 80048 BASIC METABOLIC PNL TOTAL CA: CPT

## 2018-09-17 PROCEDURE — 97803 MED NUTRITION INDIV SUBSEQ: CPT

## 2018-09-17 RX ORDER — ENOXAPARIN SODIUM 150 MG/ML
1 INJECTION SUBCUTANEOUS EVERY 24 HOURS
Status: DISCONTINUED | OUTPATIENT
Start: 2018-09-17 | End: 2018-09-18

## 2018-09-17 RX ADMIN — FUROSEMIDE 40 MG: 10 INJECTION, SOLUTION INTRAVENOUS at 05:09

## 2018-09-17 RX ADMIN — TRAMADOL HYDROCHLORIDE 50 MG: 50 TABLET, FILM COATED ORAL at 10:09

## 2018-09-17 RX ADMIN — CEFEPIME HYDROCHLORIDE 1 G: 1 INJECTION, POWDER, FOR SOLUTION INTRAMUSCULAR; INTRAVENOUS at 03:09

## 2018-09-17 RX ADMIN — LEVOTHYROXINE SODIUM 25 MCG: 25 TABLET ORAL at 05:09

## 2018-09-17 RX ADMIN — IPRATROPIUM BROMIDE AND ALBUTEROL SULFATE 3 ML: .5; 3 SOLUTION RESPIRATORY (INHALATION) at 07:09

## 2018-09-17 RX ADMIN — Medication 10 ML: at 06:09

## 2018-09-17 RX ADMIN — PANTOPRAZOLE SODIUM 40 MG: 40 TABLET, DELAYED RELEASE ORAL at 08:09

## 2018-09-17 RX ADMIN — FUROSEMIDE 40 MG: 10 INJECTION, SOLUTION INTRAVENOUS at 08:09

## 2018-09-17 RX ADMIN — IPRATROPIUM BROMIDE AND ALBUTEROL SULFATE 3 ML: .5; 3 SOLUTION RESPIRATORY (INHALATION) at 12:09

## 2018-09-17 RX ADMIN — IPRATROPIUM BROMIDE AND ALBUTEROL SULFATE 3 ML: .5; 3 SOLUTION RESPIRATORY (INHALATION) at 06:09

## 2018-09-17 RX ADMIN — IPRATROPIUM BROMIDE AND ALBUTEROL SULFATE 3 ML: .5; 3 SOLUTION RESPIRATORY (INHALATION) at 01:09

## 2018-09-17 RX ADMIN — ENOXAPARIN SODIUM 130 MG: 150 INJECTION SUBCUTANEOUS at 04:09

## 2018-09-17 RX ADMIN — LATANOPROST 1 DROP: 50 SOLUTION OPHTHALMIC at 09:09

## 2018-09-17 RX ADMIN — Medication 10 ML: at 12:09

## 2018-09-17 RX ADMIN — TRAMADOL HYDROCHLORIDE 50 MG: 50 TABLET, FILM COATED ORAL at 12:09

## 2018-09-17 RX ADMIN — DOBUTAMINE IN DEXTROSE 5 MCG/KG/MIN: 200 INJECTION, SOLUTION INTRAVENOUS at 10:09

## 2018-09-17 RX ADMIN — TIMOLOL MALEATE 1 DROP: 5 SOLUTION OPHTHALMIC at 08:09

## 2018-09-17 NOTE — PLAN OF CARE
Problem: Patient Care Overview  Goal: Plan of Care Review  Outcome: Ongoing (interventions implemented as appropriate)  Ensured patient safety with frequent checks, assessing pain and neuro, monitoring altered mental status. Patient remains with intermittent confusion. Patient remains with picc in [place]  in the rue. Patient remains 3-4+ edema generalized and skin remains intact. Remains free of fall or injury. Will continue to monitor.

## 2018-09-17 NOTE — PLAN OF CARE
Problem: Patient Care Overview  Goal: Plan of Care Review  Outcome: Ongoing (interventions implemented as appropriate)  Call placed to Dr. Holman to check if it is OK to start patient on Lovenox 1mg/kg SQ daily.  Is approved by Dr. Holman.  Order placed.

## 2018-09-17 NOTE — PLAN OF CARE
09/17/18 0713   Patient Assessment/Suction   Level of Consciousness (AVPU) alert   All Lung Fields Breath Sounds diminished   PRE-TX-O2-ETCO2   O2 Device (Oxygen Therapy) nasal cannula   $ Is the patient on Low Flow Oxygen? Yes   Flow (L/min) 6   SpO2 98 %   Pulse Oximetry Type Continuous   $ Pulse Oximetry - Multiple Charge Pulse Oximetry - Multiple   Pulse (!) 120   Resp 16   Aerosol Therapy   $ Aerosol Therapy Charges Aerosol Treatment   Respiratory Treatment Status given   SVN/Inhaler Treatment Route mask   Position During Treatment HOB at 45 degrees   Patient Tolerance good   Post-Treatment   Post-treatment Heart Rate (beats/min) 112   Post-treatment Resp Rate (breaths/min) 16   All Fields Breath Sounds aeration increased

## 2018-09-17 NOTE — PROGRESS NOTES
Progress Note  Hospital Medicine  Patient Name:Marjorie Macario  MRN:  [id number]  Patient Class: IP- Inpatient  Admit Date: 9/11/2018  Length of Stay: 5 days  Expected Discharge Date:   Attending Physician: Erin Silver MD  Primary Care Provider:  Sotero Le MD    SUBJECTIVE:     Principal Problem: Severe sepsis  Initial history of present illness: Pt was sent from [facility] for hematuria and altered mental status. Pt was difficult to arouse, now after IV fluids, awake and talking unintelligible. Unable to obtain history and ROS from pt due to encephalopathy. Records did not offer any up to date information. In July, pt was treated for septic shock due to UTI at [facility]. Pt required pressors along with mechanical ventilation at that time.     PMH/PSH/SH/FH/Meds: reviewed.    Symptoms/Review of Systems: In ICU seen and examined. Mentating at baseline. On Dobutamine IV gtt, off Neosynephrine. HD stable. Remains on abx.   Diet:  Adequate intake.    Activity level: Up with assist   Pain:  Patient reports no pain.       OBJECTIVE:   Vital Signs (Most Recent):      Temp: 98 °F (36.7 °C) (09/17/18 0330)  Pulse: (!) 120 (09/17/18 0713)  Resp: 16 (09/17/18 0713)  BP: (!) 96/53 (09/17/18 0515)  SpO2: 98 % (09/17/18 0713)       Vital Signs Range (Last 24H):  Temp:  [97.6 °F (36.4 °C)-98 °F (36.7 °C)]   Pulse:  []   Resp:  [12-26]   BP: ()/(49-96)   SpO2:  [87 %-100 %]     Weight: 128.7 kg (283 lb 11.7 oz)  Body mass index is 45.8 kg/m².    Intake/Output Summary (Last 24 hours) at 9/17/2018 0827  Last data filed at 9/17/2018 0528  Gross per 24 hour   Intake 870.35 ml   Output 2325 ml   Net -1454.65 ml     Physical Examination:  Constitutional: She is oriented to person, place, and time. No distress.   HENT:   Head: Normocephalic and atraumatic.   Eyes: Conjunctivae are normal. Right eye exhibits no discharge. Left eye exhibits no discharge. No scleral icterus.   Neck: Normal range of motion. Neck supple. No JVD  present. No thyromegaly present.   Cardiovascular: Normal rate and regular rhythm. Exam reveals no gallop and no friction rub.   Murmur heard.  Pulmonary/Chest: Effort normal and breath sounds normal. No respiratory distress. She has no wheezes. She has no rales. She exhibits no tenderness.   Abdominal: Soft. Bowel sounds are normal. She exhibits no distension. There is no tenderness. There is no rebound and no guarding.   Musculoskeletal: She exhibits no edema or tenderness. 1+ pitting edema.  Lymphadenopathy:     She has no cervical adenopathy.   Neurological: She is alert and oriented to person, place, and time. No cranial nerve deficit.   Skin: Skin is dry. Capillary refill takes less than 2 seconds. She is not diaphoretic.   Pretibial bandage removed-small ulcer with a clean base noted.     Psychiatric: She has a normal mood and affect. Her behavior is normal. Judgment and thought content normal.    CBC:  Recent Labs   Lab  09/15/18   0605  09/16/18   0337  09/17/18   0327   WBC  9.70  8.70  9.10   RBC  3.91*  3.81*  3.81*   HGB  8.6*  8.3*  8.3*   HCT  29.2*  28.0*  27.6*   PLT  292  297  269   MCV  75*  74*  72*   MCH  21.9*  21.8*  21.7*   MCHC  29.4*  29.5*  29.9*   BMP  Recent Labs   Lab  09/15/18   0606  09/16/18   0337  09/17/18   0327   GLU  103  96  161*   NA  136  136  136   K  4.3  4.1  4.0   CL  96  98  97   CO2  29  27  29   BUN  53*  55*  51*   CREATININE  1.8*  1.6*  1.4   CALCIUM  8.7  8.3*  8.5*   MG  1.7  1.6  1.6      Diagnostic Results:  Microbiology Results (last 7 days)     Procedure Component Value Units Date/Time    Blood culture x two cultures. Draw prior to antibiotics. [[id number]] Collected:  09/11/18 2151    Order Status:  Completed Specimen:  Blood Updated:  09/17/18 0612     Blood Culture, Routine No growth after 5 days.    Narrative:       Aerobic and anaerobic    Blood culture x two cultures. Draw prior to antibiotics. [[id number]] Collected:  09/11/18 2151    Order Status:   Completed Specimen:  Blood Updated:  09/17/18 0612     Blood Culture, Routine No growth after 5 days.    Narrative:       Aerobic and anaerobic    Urine culture [[id number]]  (Susceptibility) Collected:  09/11/18 2200    Order Status:  Completed Specimen:  Urine Updated:  09/14/18 1323     Urine Culture, Routine --     ESCHERICHIA COLI  50,000 - 99,999 cfu/ml      Narrative:       Preferred Collection Type->Urine, Catheterized         CXR: Perihilar and basal pleural and parenchymal changes.  Asymmetric CHF and pneumonia could both yield this appearance.    CT urogram:  Small volume of ascites, small bilateral pleural effusions and posterior bibasilar atelectasis.  Extensive subcutaneous fat stranding and fluid.  Diverticulosis without CT findings of acute diverticulitis.  Renal scarring.  Additional findings as detailed above including IVC filter, osseous degenerative change.    CXR:   Appropriately positioned right PICC line.  Cardiomegaly.  Right basilar opacification representing consolidation and/or pleural effusion.    CXR: 1. Persistent congestive heart failure not significantly changed over the interval.  2. Persistent small bilateral pleural effusions with atelectasis versus infiltrate at the lung bases.  3. Right subclavian PICC line.    Assessment/Plan:     * Severe sepsis     Secondary to UTI.  Associated with lactic acidosis and leukocytosis-now resolved.   Continue Cefepime.  Stop maintenance fluids  Remains with labile blood pressures although no evidence of worsening infection and antibitoics are culture appropriate.        Encephalopathy, metabolic     Acute  Secondary to UTI  Mild improvement after IV fluids  Continue to monitor closely        Stage 2 chronic kidney disease     Creatinine at baseline  Will trend daily        Glaucoma     Chronic, stable  Continue BB eye gtts       Acquired hypothyroidism     Chronic  Continue Synthroid       Chronic diastolic heart failure     No evidence of  exacerbation   Total body volume overloaded however  Lasix 40 BID      Bradycardia   Atrial fibrillation with slow ventricular response.  Off BB and Digoxin. Follow cardiology recommendations. On IV Dobutrex as per cardiologist. Will reduce dose to 2.5 mcg/kg per hr.     Essential hypertension     Currently on low side of normal- as above  Hold anti-hypertensive's   Monitor and treat accordingly        Acute cystitis with hematuria with E. Coli sp     Empiric coverage with cefepime  Dr. Holman is following, intermittent bladder irrigation, outpatient cystoscopy planned.  Stop Apixaban         History of pulmonary embolism     Resume Lovenox 1 mg/kg sq q day if okay with Dr. Holman.     DVT prophylaxis: Use SCD and TEDs. Resume Lovenox if okay with Dr. Holman.    Code status: DNR.    Erin Silver MD  Department of Hospital Medicine   [facility]

## 2018-09-17 NOTE — PROGRESS NOTES
" [facility]  Adult Nutrition  Progress Note    SUMMARY       Problem: Patient Care Overview  Goal: Individualization & Mutuality  Recommendation/Intervention:   1) Continue cardiac diet with 1:1 feeds- add DM restriction only if glucose becomes elevated  2) Send Boost Plus, BID   3) If pt's PO intakes remain < 25% of meals and supplements consider supplemental TF if consistent with GOC  Isosource 1.5 @ 20 ml/hr advancing to goal of 40 ml/hr + 135 ml flush q 4 hr (Meeting 87% kcal, 100 protein and 100% fluid needs)     Goals: Pt will consume/receive >=75% EEN by RD f/u.   Nutrition Goal Status: Continues  Communication of RD Recs: (RN, second sign)  Reason for Assessment     Reason for Assessment: consult  1. Acute UTI    2. Bradycardia    3. Severe sepsis    4. Altered mental status, unspecified altered mental status type    5. Hematuria, unspecified type            Past Medical History:   Diagnosis Date    Chronic diastolic heart failure 9/12/2018    Hypertension      Pulmonary embolism      Sleep apnea       on CPAP    Stage 2 chronic kidney disease 9/12/2018         General Information Comments: Admitted with AMS, hematuria. Pt more alert per notes but was sleeping at time of visit. Weight gain PTA noted, unable to determine PO intake PTA. NFPE done 9/13, f/u patient looks nourished no visible muscle/fat loss. Patient tolerating diet but with poor appetite, only taking small bites of food, since f/u (1:1 feed).      Nutrition Discharge Planning: to be determined     Nutrition Risk Screen- Needs assistance with feedings     Nutrition/Diet History     Do you have any cultural, spiritual, Yarsanism conflicts, given your current situation?: none     Anthropometrics     Temp: 96.3 °F (35.7 °C)  Height Method: Estimated  Height: 5' 6"  Height (inches): 66 in  Weight Method: Bed Scale  Weight: 128.7 kg (wt fluctuations 108.9-128.7 kg since admission, of note pt on diuretics likely r/t fluid " shifts)  Ideal Body Weight (IBW), Female: 130 lb  % Ideal Body Weight, Female (lb): 194.68 lb  BMI (Calculated): 40.9  Usual Body Weight (UBW), k.4 kg(per chart review 2/15/18)  % Usual Body Weight: 144.89  % Weight Change From Usual Weight: 44.58 %     Lab/Procedures/Meds     Pertinent Labs Reviewed: reviewed  BMP  Lab Results   Component Value Date     2018    K 4.0 2018    CL 97 2018    CO2 29 2018    BUN 51 (H) 2018    CREATININE 1.4 2018    CALCIUM 8.5 (L) 2018    ANIONGAP 10 2018    ESTGFRAFRICA 41 (A) 2018    EGFRNONAA 36 (A) 2018        Pertinent Medications:  furosemide     Physical Findings/Assessment     Oral/Mouth Cavity: tooth/teeth missing  Skin: edema(Julio score 11, no PI noted)     Estimated/Assessed Needs     Weight Used For Calorie Calculations: 114.8 kg (253 lb 1.4 oz)  Energy Calorie Requirements (kcal): 1640 (no AF required when BMI >25)  Energy Need Method: [place]  Protein Requirements:  (1.2-2.0 gm/kg/day)  Weight Used For Protein Calculations: 59 kg (130 lb)  Fluid Need Method: RDA Method  RDA Method (mL): 1640  CHO Requirement: n/a        Nutrition Prescription Ordered     Current Diet Order: cardiac     Evaluation of Received Nutrient/Fluid Intake     Energy Calories Required: Not meeting needs  Protein Required: Not meeting needs  Fluid Required: Not meeting needs       % Intake of Estimated Energy Needs: < 25%  % Meal Intake: 0-25%       Nutrition Risk     Level of Risk/Frequency of Follow-up: (2 x wkly)      Assessment and Plan     Inadequate energy intake r/t altered mental status as evidenced by       Monitor and Evaluation     Food and Nutrient Intake: energy intake  Food and Nutrient Adminstration: diet order  Physical Activity and Function: nutrition-related ADLs and IADLs  Anthropometric Measurements: weight, weight change  Biochemical Data, Medical Tests and Procedures: electrolyte and renal  panel, inflammatory profile  Nutrition-Focused Physical Findings: skin      Nutrition Follow-Up  Yes    Ursula Sanchez RDN, LDN

## 2018-09-17 NOTE — PLAN OF CARE
CM continuing to follow, not appropriate to return to [facility] at this time.   Will continue to follow and assist as needed....ANNALISA Kessler CM      09/17/18 1136   Discharge Reassessment   Assessment Type Discharge Planning Reassessment

## 2018-09-18 LAB
ANION GAP SERPL CALC-SCNC: 6 MMOL/L
ANISOCYTOSIS BLD QL SMEAR: ABNORMAL
BASOPHILS # BLD AUTO: ABNORMAL K/UL
BASOPHILS NFR BLD: 2 %
BUN SERPL-MCNC: 44 MG/DL
CALCIUM SERPL-MCNC: 8.7 MG/DL
CHLORIDE SERPL-SCNC: 98 MMOL/L
CO2 SERPL-SCNC: 35 MMOL/L
CREAT SERPL-MCNC: 1.2 MG/DL
DIFFERENTIAL METHOD: ABNORMAL
EOSINOPHIL # BLD AUTO: ABNORMAL K/UL
EOSINOPHIL NFR BLD: 4 %
ERYTHROCYTE [DISTWIDTH] IN BLOOD BY AUTOMATED COUNT: 22.1 %
EST. GFR  (AFRICAN AMERICAN): 50 ML/MIN/1.73 M^2
EST. GFR  (NON AFRICAN AMERICAN): 43 ML/MIN/1.73 M^2
GLUCOSE SERPL-MCNC: 107 MG/DL
HCT VFR BLD AUTO: 28.8 %
HGB BLD-MCNC: 8.7 G/DL
HYPOCHROMIA BLD QL SMEAR: ABNORMAL
INR PPP: 1.5
LYMPHOCYTES # BLD AUTO: ABNORMAL K/UL
LYMPHOCYTES NFR BLD: 14 %
MAGNESIUM SERPL-MCNC: 1.6 MG/DL
MCH RBC QN AUTO: 21.7 PG
MCHC RBC AUTO-ENTMCNC: 30 G/DL
MCV RBC AUTO: 72 FL
METAMYELOCYTES NFR BLD MANUAL: 1 %
MONOCYTES # BLD AUTO: ABNORMAL K/UL
MONOCYTES NFR BLD: 3 %
NEUTROPHILS NFR BLD: 75 %
NEUTS BAND NFR BLD MANUAL: 1 %
NRBC BLD-RTO: 6 /100 WBC
OVALOCYTES BLD QL SMEAR: ABNORMAL
PHOSPHATE SERPL-MCNC: 2.6 MG/DL
PLATELET # BLD AUTO: 315 K/UL
PLATELET BLD QL SMEAR: ABNORMAL
PMV BLD AUTO: 8.7 FL
POCT GLUCOSE: 114 MG/DL (ref 70–110)
POIKILOCYTOSIS BLD QL SMEAR: SLIGHT
POLYCHROMASIA BLD QL SMEAR: ABNORMAL
POTASSIUM SERPL-SCNC: 3.6 MMOL/L
PROTHROMBIN TIME: 15.5 SEC
RBC # BLD AUTO: 4 M/UL
SODIUM SERPL-SCNC: 139 MMOL/L
TARGETS BLD QL SMEAR: ABNORMAL
WBC # BLD AUTO: 8.9 K/UL

## 2018-09-18 PROCEDURE — 36415 COLL VENOUS BLD VENIPUNCTURE: CPT

## 2018-09-18 PROCEDURE — 84100 ASSAY OF PHOSPHORUS: CPT

## 2018-09-18 PROCEDURE — 85027 COMPLETE CBC AUTOMATED: CPT

## 2018-09-18 PROCEDURE — 25000242 PHARM REV CODE 250 ALT 637 W/ HCPCS: Performed by: INTERNAL MEDICINE

## 2018-09-18 PROCEDURE — A4216 STERILE WATER/SALINE, 10 ML: HCPCS | Performed by: INTERNAL MEDICINE

## 2018-09-18 PROCEDURE — 27000221 HC OXYGEN, UP TO 24 HOURS

## 2018-09-18 PROCEDURE — 63600175 PHARM REV CODE 636 W HCPCS: Performed by: INTERNAL MEDICINE

## 2018-09-18 PROCEDURE — 20000000 HC ICU ROOM

## 2018-09-18 PROCEDURE — 94640 AIRWAY INHALATION TREATMENT: CPT

## 2018-09-18 PROCEDURE — 25000003 PHARM REV CODE 250: Performed by: INTERNAL MEDICINE

## 2018-09-18 PROCEDURE — 99233 SBSQ HOSP IP/OBS HIGH 50: CPT | Mod: ,,, | Performed by: INTERNAL MEDICINE

## 2018-09-18 PROCEDURE — 94761 N-INVAS EAR/PLS OXIMETRY MLT: CPT

## 2018-09-18 PROCEDURE — 80048 BASIC METABOLIC PNL TOTAL CA: CPT

## 2018-09-18 PROCEDURE — 25000003 PHARM REV CODE 250: Performed by: NURSE PRACTITIONER

## 2018-09-18 PROCEDURE — 85007 BL SMEAR W/DIFF WBC COUNT: CPT

## 2018-09-18 PROCEDURE — 83735 ASSAY OF MAGNESIUM: CPT

## 2018-09-18 PROCEDURE — 85610 PROTHROMBIN TIME: CPT

## 2018-09-18 PROCEDURE — 63600175 PHARM REV CODE 636 W HCPCS: Performed by: NURSE PRACTITIONER

## 2018-09-18 RX ORDER — ENOXAPARIN SODIUM 150 MG/ML
1 INJECTION SUBCUTANEOUS
Status: DISCONTINUED | OUTPATIENT
Start: 2018-09-18 | End: 2018-09-24

## 2018-09-18 RX ADMIN — IPRATROPIUM BROMIDE AND ALBUTEROL SULFATE 3 ML: .5; 3 SOLUTION RESPIRATORY (INHALATION) at 12:09

## 2018-09-18 RX ADMIN — LATANOPROST 1 DROP: 50 SOLUTION OPHTHALMIC at 08:09

## 2018-09-18 RX ADMIN — FUROSEMIDE 40 MG: 10 INJECTION, SOLUTION INTRAVENOUS at 05:09

## 2018-09-18 RX ADMIN — IPRATROPIUM BROMIDE AND ALBUTEROL SULFATE 3 ML: .5; 3 SOLUTION RESPIRATORY (INHALATION) at 07:09

## 2018-09-18 RX ADMIN — Medication 10 ML: at 12:09

## 2018-09-18 RX ADMIN — TRAMADOL HYDROCHLORIDE 50 MG: 50 TABLET, FILM COATED ORAL at 08:09

## 2018-09-18 RX ADMIN — FUROSEMIDE 40 MG: 10 INJECTION, SOLUTION INTRAVENOUS at 08:09

## 2018-09-18 RX ADMIN — POTASSIUM PHOSPHATE, MONOBASIC AND POTASSIUM PHOSPHATE, DIBASIC 15 MMOL: 224; 236 INJECTION, SOLUTION INTRAVENOUS at 10:09

## 2018-09-18 RX ADMIN — Medication 10 ML: at 05:09

## 2018-09-18 RX ADMIN — LEVOTHYROXINE SODIUM 25 MCG: 25 TABLET ORAL at 05:09

## 2018-09-18 RX ADMIN — CEFEPIME HYDROCHLORIDE 1 G: 1 INJECTION, POWDER, FOR SOLUTION INTRAMUSCULAR; INTRAVENOUS at 02:09

## 2018-09-18 RX ADMIN — PANTOPRAZOLE SODIUM 40 MG: 40 TABLET, DELAYED RELEASE ORAL at 08:09

## 2018-09-18 RX ADMIN — TIMOLOL MALEATE 1 DROP: 5 SOLUTION OPHTHALMIC at 08:09

## 2018-09-18 RX ADMIN — ENOXAPARIN SODIUM 130 MG: 150 INJECTION SUBCUTANEOUS at 05:09

## 2018-09-18 NOTE — PLAN OF CARE
09/17/18 1859   Patient Assessment/Suction   Level of Consciousness (AVPU) alert   Respiratory Effort Normal;Unlabored   Expansion/Accessory Muscles/Retractions no use of accessory muscles   All Lung Fields Breath Sounds diminished   Rhythm/Pattern, Respiratory depth regular;pattern regular;unlabored   PRE-TX-O2-ETCO2   O2 Device (Oxygen Therapy) nasal cannula   $ Is the patient on Low Flow Oxygen? Yes   Flow (L/min) 5   SpO2 98 %   Pulse Oximetry Type Continuous   $ Pulse Oximetry - Multiple Charge Pulse Oximetry - Multiple   Pulse 88   Resp 17   Aerosol Therapy   $ Aerosol Therapy Charges Aerosol Treatment   Respiratory Treatment Status given   SVN/Inhaler Treatment Route mask   Position During Treatment HOB at 45 degrees   Patient Tolerance good   Post-Treatment   Post-treatment Heart Rate (beats/min) 88   Post-treatment Resp Rate (breaths/min) 16   All Fields Breath Sounds aeration increased

## 2018-09-18 NOTE — PLAN OF CARE
Problem: Patient Care Overview  Goal: Plan of Care Review  Pt progressed today.  Still confused to time and situation.  Able to stop dobutamine and pressure maintained.  Adequate output.  Total assistance to feed.  Still with decreased appetite.  + large BM,  Oxygen in use.  PICC line to right upper arm.  accu checks stopped.  Still with +3-4 edema.  Bed on continuous rotation.  Plan to transfer to floor tomorrow.  Family updated.  Questions and concerns addressed.

## 2018-09-18 NOTE — PROGRESS NOTES
[facility]  Cardiology  Progress Note    Patient Name: Marjorie Macario  MRN: [id number]  Admission Date: 9/11/2018  Hospital Length of Stay: 6 days  Code Status: DNR   Attending Physician: Erin Silver MD   Primary Care Physician: Sotero Le MD  Expected Discharge Date:   Principal Problem:Severe sepsis    Subjective:     Hospital Course: doing fine      Interval History: off dobutamine and vasopressors  Ok transfer to floor  Restart Betablocker when tachycardic    Review of Systems   Constitution: Positive for weakness and weight gain.   Cardiovascular: Positive for leg swelling and palpitations.   Respiratory: Negative.    Endocrine: Negative.    Skin: Negative.    Musculoskeletal: Negative.    Gastrointestinal: Negative.    Genitourinary: Negative.      Objective:     Vital Signs (Most Recent):  Temp: 97.5 °F (36.4 °C) (09/18/18 1145)  Pulse: 85 (09/18/18 1445)  Resp: (!) 27 (09/18/18 1445)  BP: (!) 143/79 (09/18/18 1430)  SpO2: (!) 94 % (09/18/18 1445) Vital Signs (24h Range):  Temp:  [97.5 °F (36.4 °C)-98.5 °F (36.9 °C)] 97.5 °F (36.4 °C)  Pulse:  [] 85  Resp:  [13-29] 27  SpO2:  [91 %-99 %] 94 %  BP: (100-182)/() 143/79     Weight: 128.7 kg (283 lb 11.7 oz)  Body mass index is 45.8 kg/m².    SpO2: (!) 94 %  O2 Device (Oxygen Therapy): nasal cannula w/ humidification      Intake/Output Summary (Last 24 hours) at 9/18/2018 1611  Last data filed at 9/18/2018 1400  Gross per 24 hour   Intake 800 ml   Output 4165 ml   Net -3365 ml       Lines/Drains/Airways     Peripherally Inserted Central Catheter Line                 PICC Double Lumen 09/13/18 1645 right basilic 4 days          Drain                 Urethral Catheter 09/12/18 0020 6 days                Physical Exam   Constitutional: She appears well-developed.   Eyes: Pupils are equal, round, and reactive to light.   Neck: Normal range of motion.   Cardiovascular: Normal rate.   Pulmonary/Chest: Effort normal.   Abdominal:  Soft.   Neurological: She is alert.   Vitals reviewed.      Significant Labs:   CMP   Recent Labs   Lab  09/17/18   0327  09/18/18   0304   NA  136  139   K  4.0  3.6   CL  97  98   CO2  29  35*   GLU  161*  107   BUN  51*  44*   CREATININE  1.4  1.2   CALCIUM  8.5*  8.7   ANIONGAP  10  6*   ESTGFRAFRICA  41*  50*   EGFRNONAA  36*  43*   , CBC   Recent Labs   Lab  09/17/18   0327  09/18/18   0305   WBC  9.10  8.90   HGB  8.3*  8.7*   HCT  27.6*  28.8*   PLT  269  315   , Lipid Panel No results for input(s): CHOL, HDL, LDLCALC, TRIG, CHOLHDL in the last 48 hours. and Troponin No results for input(s): TROPONINI in the last 48 hours.    Significant Imaging: X-Ray: CXR: X-Ray Chest 1 View (CXR):   Results for orders placed or performed during the hospital encounter of 09/11/18   X-Ray Chest 1 View    Narrative    EXAMINATION:  XR CHEST 1 VIEW    CLINICAL HISTORY:  cough;    TECHNIQUE:  Single frontal view of the chest was performed.    COMPARISON:  Chest x-ray 09/13/2018.    FINDINGS:  There is again cardiomegaly and interstitial edema consistent with congestive heart failure.  This is not significantly changed over the interval.  There are persistent small bilateral pleural effusions with atelectasis versus infiltrate at the lung bases.    Right subclavian PICC line remains in satisfactory position.      Impression    1. Persistent congestive heart failure not significantly changed over the interval.  2. Persistent small bilateral pleural effusions with atelectasis versus infiltrate at the lung bases.  3. Right subclavian PICC line.      Electronically signed by: Terry Quintana  Date:    09/15/2018  Time:    13:06     Assessment and Plan:     Brief HPI:     Active Diagnoses:    Diagnosis Date Noted POA    PRINCIPAL PROBLEM:  Severe sepsis [A41.9, R65.20] 09/12/2018 Yes    History of pulmonary embolism [Z86.711] 09/12/2018 Yes    Acute cystitis with hematuria [N30.01] 09/12/2018 Yes    Essential hypertension [I10]  09/12/2018 Yes    Chronic diastolic heart failure [I50.32] 09/12/2018 Yes    Acquired hypothyroidism [E03.9] 09/12/2018 Yes    Glaucoma [H40.9] 09/12/2018 Yes    Stage 2 chronic kidney disease [N18.2] 09/12/2018 Yes    Encephalopathy, metabolic [G93.41] 09/12/2018 Yes      Problems Resolved During this Admission:       VTE Risk Mitigation (From admission, onward)        Ordered     enoxaparin injection 130 mg  Daily      09/17/18 1549     Place sequential compression device  Until discontinued      09/12/18 1622          Chema Lunsford MD  Cardiology  [facility]

## 2018-09-18 NOTE — NURSING
Updated Melissa, pts daughter, on pt status.  Password establish and daughter to share with family.  Informed son who is at bedside at this time.

## 2018-09-18 NOTE — NURSING
1130:  S/c Dr To re consult per Dr Silver request.    1115: dobutamine stopped per Dr Rabago request    1620: Dr Lunsford, pts cardiologist, rounding.  Ok with pt transferring to floor.  Updated Dr Silver.  Pt vital signs maintaining off dobutamine.  Call placed to Dr To to disregard consult.

## 2018-09-18 NOTE — PROGRESS NOTES
09/18/18 1238   Patient Assessment/Suction   Level of Consciousness (AVPU) alert   Respiratory Effort Normal;Unlabored   All Lung Fields Breath Sounds crackles   PRE-TX-O2-ETCO2   O2 Device (Oxygen Therapy) nasal cannula w/ humidification   Flow (L/min) 4   Oxygen Concentration (%) 36   SpO2 95 %   Pulse Oximetry Type Continuous   Pulse 78   Resp 18   Aerosol Therapy   $ Aerosol Therapy Charges Aerosol Treatment   Respiratory Treatment Status given   SVN/Inhaler Treatment Route mask   Position During Treatment HOB at 45 degrees   Patient Tolerance good   Post-Treatment   Post-treatment Heart Rate (beats/min) 77   Post-treatment Resp Rate (breaths/min) 14   All Fields Breath Sounds unchanged   Duo Q6, currently wearing NC 4L, vitals as charted, tolerates tx well.

## 2018-09-18 NOTE — PLAN OF CARE
Problem: Patient Care Overview  Goal: Plan of Care Review  Outcome: Ongoing (interventions implemented as appropriate)  Ensured patient safety with frequent checks, assessing pain and neuro, monitoring altered mental status. Patient remains with intermittent confusion. Patient remains with picc in [place]  in the rue. Patient remains 3-4+ edema generalized. Remains free of fall or injury this shift. Accu-checks completed and wnl. Will continue to monitor.

## 2018-09-18 NOTE — PROGRESS NOTES
Progress Note  Hospital Medicine  Patient Name:Marjorie Macario  MRN:  [id number]  Patient Class: IP- Inpatient  Admit Date: 9/11/2018  Length of Stay: 6 days  Expected Discharge Date:   Attending Physician: Erin Silver MD  Primary Care Provider:  Sotero Le MD    SUBJECTIVE:     Principal Problem: Severe sepsis  Initial history of present illness: Pt was sent from [facility] for hematuria and altered mental status. Pt was difficult to arouse, now after IV fluids, awake and talking unintelligible. Unable to obtain history and ROS from pt due to encephalopathy. Records did not offer any up to date information. In July, pt was treated for septic shock due to UTI at [facility]. Pt required pressors along with mechanical ventilation at that time.     PMH/PSH/SH/FH/Meds: reviewed.    Symptoms/Review of Systems: In ICU seen and examined. Mentating at baseline. On Dobutamine IV gtt, off Neosynephrine. HD stable. Remains on abx.   Diet:  Adequate intake.    Activity level: Up with assist   Pain:  Patient reports no pain.       OBJECTIVE:   Vital Signs (Most Recent):      Temp: 97.6 °F (36.4 °C) (09/18/18 0800)  Pulse: 101 (09/18/18 0815)  Resp: 15 (09/18/18 0815)  BP: (!) 143/77 (09/18/18 0815)  SpO2: 95 % (09/18/18 0815)       Vital Signs Range (Last 24H):  Temp:  [97.6 °F (36.4 °C)-98.8 °F (37.1 °C)]   Pulse:  []   Resp:  [13-29]   BP: ()/()   SpO2:  [93 %-100 %]     Weight: 128.7 kg (283 lb 11.7 oz)  Body mass index is 45.8 kg/m².    Intake/Output Summary (Last 24 hours) at 9/18/2018 0846  Last data filed at 9/18/2018 0800  Gross per 24 hour   Intake 910 ml   Output 3450 ml   Net -2540 ml     Physical Examination:  Constitutional: She is oriented to person, place, and time. No distress.   HENT:   Head: Normocephalic and atraumatic.   Eyes: Conjunctivae are normal. Right eye exhibits no discharge. Left eye exhibits no discharge. No scleral icterus.   Neck: Normal range of motion. Neck supple. No JVD  present. No thyromegaly present.   Cardiovascular: Normal rate and regular rhythm. Exam reveals no gallop and no friction rub.   Murmur heard.  Pulmonary/Chest: Effort normal and breath sounds normal. No respiratory distress. She has no wheezes. She has no rales. She exhibits no tenderness.   Abdominal: Soft. Bowel sounds are normal. She exhibits no distension. There is no tenderness. There is no rebound and no guarding.   Musculoskeletal: She exhibits no edema or tenderness. 1+ pitting edema.  Lymphadenopathy:     She has no cervical adenopathy.   Neurological: She is alert and oriented to person, place, and time. No cranial nerve deficit.   Skin: Skin is dry. Capillary refill takes less than 2 seconds. She is not diaphoretic.   Pretibial bandage removed-small ulcer with a clean base noted.     Psychiatric: She has a normal mood and affect. Her behavior is normal. Judgment and thought content normal.    CBC:  Recent Labs   Lab  09/16/18 0337 09/17/18   0327  09/18/18   0305   WBC  8.70  9.10  8.90   RBC  3.81*  3.81*  4.00   HGB  8.3*  8.3*  8.7*   HCT  28.0*  27.6*  28.8*   PLT  297  269  315   MCV  74*  72*  72*   MCH  21.8*  21.7*  21.7*   MCHC  29.5*  29.9*  30.0*   BMP  Recent Labs   Lab  09/16/18   0337  09/17/18   0327  09/18/18   0304   GLU  96  161*  107   NA  136  136  139   K  4.1  4.0  3.6   CL  98  97  98   CO2  27  29  35*   BUN  55*  51*  44*   CREATININE  1.6*  1.4  1.2   CALCIUM  8.3*  8.5*  8.7   MG  1.6  1.6  1.6      Diagnostic Results:  Microbiology Results (last 7 days)     Procedure Component Value Units Date/Time    Blood culture x two cultures. Draw prior to antibiotics. [[id number]] Collected:  09/11/18 2151    Order Status:  Completed Specimen:  Blood Updated:  09/17/18 0612     Blood Culture, Routine No growth after 5 days.    Narrative:       Aerobic and anaerobic    Blood culture x two cultures. Draw prior to antibiotics. [[id number]] Collected:  09/11/18 2151    Order Status:   Completed Specimen:  Blood Updated:  09/17/18 0612     Blood Culture, Routine No growth after 5 days.    Narrative:       Aerobic and anaerobic    Urine culture [[id number]]  (Susceptibility) Collected:  09/11/18 2200    Order Status:  Completed Specimen:  Urine Updated:  09/14/18 1323     Urine Culture, Routine --     ESCHERICHIA COLI  50,000 - 99,999 cfu/ml      Narrative:       Preferred Collection Type->Urine, Catheterized         CXR: Perihilar and basal pleural and parenchymal changes.  Asymmetric CHF and pneumonia could both yield this appearance.    CT urogram:  Small volume of ascites, small bilateral pleural effusions and posterior bibasilar atelectasis.  Extensive subcutaneous fat stranding and fluid.  Diverticulosis without CT findings of acute diverticulitis.  Renal scarring.  Additional findings as detailed above including IVC filter, osseous degenerative change.    CXR:   Appropriately positioned right PICC line.  Cardiomegaly.  Right basilar opacification representing consolidation and/or pleural effusion.    CXR: 1. Persistent congestive heart failure not significantly changed over the interval.  2. Persistent small bilateral pleural effusions with atelectasis versus infiltrate at the lung bases.  3. Right subclavian PICC line.    Assessment/Plan:     * Severe sepsis     Secondary to UTI.  Associated with lactic acidosis and leukocytosis-now resolved.   Continue Cefepime.  Stop maintenance fluids  Remains with labile blood pressures although no evidence of worsening infection and antibitoics are culture appropriate.        Encephalopathy, metabolic     Acute  Secondary to UTI  Mild improvement after IV fluids  Continue to monitor closely        Stage 2 chronic kidney disease     Creatinine at baseline  Will trend daily        Glaucoma     Chronic, stable  Continue BB eye gtts       Acquired hypothyroidism     Chronic  Continue Synthroid       Chronic diastolic heart failure     No evidence of  exacerbation   Total body volume overloaded however  Lasix 40 BID      Bradycardia   Atrial fibrillation with slow ventricular response.  Off BB and Digoxin. Follow cardiology recommendations. Will DC IV Dobutrex and closely monitor heart rate and evaluate for PM need. Cardiology team following.     Essential hypertension     Currently on low side of normal- as above  Hold anti-hypertensive's   Monitor and treat accordingly        Acute cystitis with hematuria with E. Coli sp     Empiric coverage with cefepime  Dr. Holman is following, intermittent bladder irrigation, outpatient cystoscopy planned.       History of pulmonary embolism     Continue Lovenox 1 mg/kg sq q day.     DVT prophylaxis: Use SCD and TEDs. On Lovenox.    Code status: DNR.    Erin Silver MD  Department of Hospital Medicine   [facility]

## 2018-09-18 NOTE — PROGRESS NOTES
The enoxaparin dose has been adjusted for Marjorie Macario [id number] according to the pharmacy practice protocol.      Based on the patient's Estimated Creatinine Clearance: 52.3 mL/min (based on SCr of 1.2 mg/dL)., Body mass index is 45.8 kg/m²., and weight= 128.7 kg (283 lb 11.7 oz), the enoxaparin dose has been adjusted 1 mg/kg (130 mg) every 12 hours.    Thank you,  Tamara Gonzalez

## 2018-09-19 LAB
ANION GAP SERPL CALC-SCNC: 8 MMOL/L
ANISOCYTOSIS BLD QL SMEAR: ABNORMAL
BASOPHILS # BLD AUTO: ABNORMAL K/UL
BASOPHILS NFR BLD: 0 %
BUN SERPL-MCNC: 35 MG/DL
CALCIUM SERPL-MCNC: 8.9 MG/DL
CHLORIDE SERPL-SCNC: 97 MMOL/L
CO2 SERPL-SCNC: 34 MMOL/L
CREAT SERPL-MCNC: 0.9 MG/DL
DIFFERENTIAL METHOD: ABNORMAL
EOSINOPHIL # BLD AUTO: ABNORMAL K/UL
EOSINOPHIL NFR BLD: 11 %
ERYTHROCYTE [DISTWIDTH] IN BLOOD BY AUTOMATED COUNT: 22.6 %
EST. GFR  (AFRICAN AMERICAN): >60 ML/MIN/1.73 M^2
EST. GFR  (NON AFRICAN AMERICAN): >60 ML/MIN/1.73 M^2
GIANT PLATELETS BLD QL SMEAR: PRESENT
GLUCOSE SERPL-MCNC: 99 MG/DL
HCT VFR BLD AUTO: 30.5 %
HGB BLD-MCNC: 9.4 G/DL
HYPOCHROMIA BLD QL SMEAR: ABNORMAL
INR PPP: 1.5
LYMPHOCYTES # BLD AUTO: ABNORMAL K/UL
LYMPHOCYTES NFR BLD: 9 %
MAGNESIUM SERPL-MCNC: 1.5 MG/DL
MCH RBC QN AUTO: 22.4 PG
MCHC RBC AUTO-ENTMCNC: 30.7 G/DL
MCV RBC AUTO: 73 FL
METAMYELOCYTES NFR BLD MANUAL: 1 %
MONOCYTES # BLD AUTO: ABNORMAL K/UL
MONOCYTES NFR BLD: 7 %
NEUTROPHILS NFR BLD: 67 %
NEUTS BAND NFR BLD MANUAL: 5 %
NRBC BLD-RTO: 2 /100 WBC
OVALOCYTES BLD QL SMEAR: ABNORMAL
PHOSPHATE SERPL-MCNC: 2.5 MG/DL
PLATELET # BLD AUTO: 282 K/UL
PLATELET BLD QL SMEAR: ABNORMAL
PMV BLD AUTO: 9.8 FL
POIKILOCYTOSIS BLD QL SMEAR: SLIGHT
POLYCHROMASIA BLD QL SMEAR: ABNORMAL
POTASSIUM SERPL-SCNC: 3.5 MMOL/L
PROTHROMBIN TIME: 14.7 SEC
RBC # BLD AUTO: 4.19 M/UL
SODIUM SERPL-SCNC: 139 MMOL/L
TARGETS BLD QL SMEAR: ABNORMAL
WBC # BLD AUTO: 10.7 K/UL

## 2018-09-19 PROCEDURE — 36415 COLL VENOUS BLD VENIPUNCTURE: CPT

## 2018-09-19 PROCEDURE — A4216 STERILE WATER/SALINE, 10 ML: HCPCS | Performed by: INTERNAL MEDICINE

## 2018-09-19 PROCEDURE — 94640 AIRWAY INHALATION TREATMENT: CPT

## 2018-09-19 PROCEDURE — 63600175 PHARM REV CODE 636 W HCPCS: Performed by: NURSE PRACTITIONER

## 2018-09-19 PROCEDURE — 63600175 PHARM REV CODE 636 W HCPCS: Performed by: INTERNAL MEDICINE

## 2018-09-19 PROCEDURE — 27000221 HC OXYGEN, UP TO 24 HOURS

## 2018-09-19 PROCEDURE — 25000003 PHARM REV CODE 250: Performed by: INTERNAL MEDICINE

## 2018-09-19 PROCEDURE — 63600175 PHARM REV CODE 636 W HCPCS

## 2018-09-19 PROCEDURE — 25000242 PHARM REV CODE 250 ALT 637 W/ HCPCS: Performed by: INTERNAL MEDICINE

## 2018-09-19 PROCEDURE — 94761 N-INVAS EAR/PLS OXIMETRY MLT: CPT

## 2018-09-19 PROCEDURE — 25000003 PHARM REV CODE 250: Performed by: NURSE PRACTITIONER

## 2018-09-19 PROCEDURE — 99233 SBSQ HOSP IP/OBS HIGH 50: CPT | Mod: ,,, | Performed by: INTERNAL MEDICINE

## 2018-09-19 PROCEDURE — 85027 COMPLETE CBC AUTOMATED: CPT

## 2018-09-19 PROCEDURE — 85007 BL SMEAR W/DIFF WBC COUNT: CPT

## 2018-09-19 PROCEDURE — 20000000 HC ICU ROOM

## 2018-09-19 PROCEDURE — 84100 ASSAY OF PHOSPHORUS: CPT

## 2018-09-19 PROCEDURE — 85610 PROTHROMBIN TIME: CPT

## 2018-09-19 PROCEDURE — 80048 BASIC METABOLIC PNL TOTAL CA: CPT

## 2018-09-19 PROCEDURE — 83735 ASSAY OF MAGNESIUM: CPT

## 2018-09-19 RX ORDER — MAGNESIUM SULFATE HEPTAHYDRATE 40 MG/ML
2 INJECTION, SOLUTION INTRAVENOUS ONCE
Status: COMPLETED | OUTPATIENT
Start: 2018-09-19 | End: 2018-09-19

## 2018-09-19 RX ORDER — HALOPERIDOL 5 MG/ML
5 INJECTION INTRAMUSCULAR ONCE
Status: COMPLETED | OUTPATIENT
Start: 2018-09-19 | End: 2018-09-19

## 2018-09-19 RX ORDER — HALOPERIDOL 5 MG/ML
INJECTION INTRAMUSCULAR
Status: COMPLETED
Start: 2018-09-19 | End: 2018-09-19

## 2018-09-19 RX ADMIN — ENOXAPARIN SODIUM 130 MG: 150 INJECTION SUBCUTANEOUS at 05:09

## 2018-09-19 RX ADMIN — ENOXAPARIN SODIUM 130 MG: 150 INJECTION SUBCUTANEOUS at 06:09

## 2018-09-19 RX ADMIN — Medication 10 ML: at 05:09

## 2018-09-19 RX ADMIN — FUROSEMIDE 40 MG: 10 INJECTION, SOLUTION INTRAVENOUS at 07:09

## 2018-09-19 RX ADMIN — IPRATROPIUM BROMIDE AND ALBUTEROL SULFATE 3 ML: .5; 3 SOLUTION RESPIRATORY (INHALATION) at 12:09

## 2018-09-19 RX ADMIN — CEFEPIME HYDROCHLORIDE 1 G: 1 INJECTION, POWDER, FOR SOLUTION INTRAMUSCULAR; INTRAVENOUS at 02:09

## 2018-09-19 RX ADMIN — HALOPERIDOL 5 MG: 5 INJECTION INTRAMUSCULAR at 08:09

## 2018-09-19 RX ADMIN — Medication 10 ML: at 12:09

## 2018-09-19 RX ADMIN — MAGNESIUM SULFATE IN WATER 2 G: 40 INJECTION, SOLUTION INTRAVENOUS at 03:09

## 2018-09-19 RX ADMIN — LATANOPROST 1 DROP: 50 SOLUTION OPHTHALMIC at 09:09

## 2018-09-19 RX ADMIN — POTASSIUM PHOSPHATE, MONOBASIC AND POTASSIUM PHOSPHATE, DIBASIC 20 MMOL: 224; 236 INJECTION, SOLUTION INTRAVENOUS at 03:09

## 2018-09-19 RX ADMIN — TIMOLOL MALEATE 1 DROP: 5 SOLUTION OPHTHALMIC at 08:09

## 2018-09-19 RX ADMIN — IPRATROPIUM BROMIDE AND ALBUTEROL SULFATE 3 ML: .5; 3 SOLUTION RESPIRATORY (INHALATION) at 07:09

## 2018-09-19 RX ADMIN — FUROSEMIDE 40 MG: 10 INJECTION, SOLUTION INTRAVENOUS at 03:09

## 2018-09-19 RX ADMIN — LEVOTHYROXINE SODIUM 25 MCG: 25 TABLET ORAL at 05:09

## 2018-09-19 RX ADMIN — CEFEPIME HYDROCHLORIDE 1 G: 1 INJECTION, POWDER, FOR SOLUTION INTRAMUSCULAR; INTRAVENOUS at 03:09

## 2018-09-19 RX ADMIN — HALOPERIDOL LACTATE 5 MG: 5 INJECTION, SOLUTION INTRAMUSCULAR at 08:09

## 2018-09-19 NOTE — NURSING
#20 piv placed by JESSI Frost RN.  K Phos and Mag replacement started.  Agitated again and yelling. Pulling at soft wrist restraints.  Attempted to reassure.  Contnue to monitor.

## 2018-09-19 NOTE — PROGRESS NOTES
Progress Note  Hospital Medicine  Patient Name:Marjorie Macario  MRN:  [id number]  Patient Class: IP- Inpatient  Admit Date: 9/11/2018  Length of Stay: 7 days  Expected Discharge Date:   Attending Physician: Erin Silver MD  Primary Care Provider:  Sotero Le MD    SUBJECTIVE:     Principal Problem: Severe sepsis  Initial history of present illness: Pt was sent from [facility] for hematuria and altered mental status. Pt was difficult to arouse, now after IV fluids, awake and talking unintelligible. Unable to obtain history and ROS from pt due to encephalopathy. Records did not offer any up to date information. In July, pt was treated for septic shock due to UTI at [facility]. Pt required pressors along with mechanical ventilation at that time.     PMH/PSH/SH/FH/Meds: reviewed.    Symptoms/Review of Systems: In ICU, patient was earlier agitated and restless, needed antipsychotic, IV access is lost. HD stable. Remains on abx.   Diet:  Adequate intake.    Activity level: Up with assist   Pain:  Patient reports no pain.       OBJECTIVE:   Vital Signs (Most Recent):      Temp: 98 °F (36.7 °C) (09/18/18 2300)  Pulse: 82 (09/19/18 0757)  Resp: 18 (09/19/18 0757)  BP: 108/75 (09/19/18 0215)  SpO2: 100 % (09/19/18 0757)       Vital Signs Range (Last 24H):  Temp:  [97.5 °F (36.4 °C)-98.5 °F (36.9 °C)]   Pulse:  [70-97]   Resp:  [13-29]   BP: (108-182)/()   SpO2:  [85 %-100 %]     Weight: 123.6 kg (272 lb 7.8 oz)  Body mass index is 43.98 kg/m².    Intake/Output Summary (Last 24 hours) at 9/19/2018 0912  Last data filed at 9/19/2018 0600  Gross per 24 hour   Intake 768.02 ml   Output 2115 ml   Net -1346.98 ml     Physical Examination:  Constitutional: She is oriented to person, place, and time. No distress.   HENT:   Head: Normocephalic and atraumatic.   Eyes: Conjunctivae are normal. Right eye exhibits no discharge. Left eye exhibits no discharge. No scleral icterus.   Neck: Normal range of motion. Neck supple. No JVD  present. No thyromegaly present.   Cardiovascular: Normal rate and regular rhythm. Exam reveals no gallop and no friction rub.   Murmur heard.  Pulmonary/Chest: Effort normal and breath sounds normal. No respiratory distress. She has no wheezes. She has no rales. She exhibits no tenderness.   Abdominal: Soft. Bowel sounds are normal. She exhibits no distension. There is no tenderness. There is no rebound and no guarding.   Musculoskeletal: She exhibits no edema or tenderness. 1+ pitting edema.  Lymphadenopathy:     She has no cervical adenopathy.   Neurological: She is alert and oriented to person, place, and time. No cranial nerve deficit.   Skin: Skin is dry. Capillary refill takes less than 2 seconds. She is not diaphoretic.   Pretibial bandage removed-small ulcer with a clean base noted.     Psychiatric: She has a normal mood and affect. Her behavior is normal. Judgment and thought content normal.    CBC:  Recent Labs   Lab  09/17/18   0327  09/18/18   0305  09/19/18   0329   WBC  9.10  8.90  10.70   RBC  3.81*  4.00  4.19   HGB  8.3*  8.7*  9.4*   HCT  27.6*  28.8*  30.5*   PLT  269  315  282   MCV  72*  72*  73*   MCH  21.7*  21.7*  22.4*   MCHC  29.9*  30.0*  30.7*   BMP  Recent Labs   Lab  09/17/18   0327  09/18/18   0304  09/19/18   0329   GLU  161*  107  99   NA  136  139  139   K  4.0  3.6  3.5   CL  97  98  97   CO2  29  35*  34*   BUN  51*  44*  35*   CREATININE  1.4  1.2  0.9   CALCIUM  8.5*  8.7  8.9   MG  1.6  1.6  1.5*      Diagnostic Results:  Microbiology Results (last 7 days)     Procedure Component Value Units Date/Time    Blood culture x two cultures. Draw prior to antibiotics. [[id number]] Collected:  09/11/18 2151    Order Status:  Completed Specimen:  Blood Updated:  09/17/18 0612     Blood Culture, Routine No growth after 5 days.    Narrative:       Aerobic and anaerobic    Blood culture x two cultures. Draw prior to antibiotics. [[id number]] Collected:  09/11/18 2151    Order Status:   Completed Specimen:  Blood Updated:  09/17/18 0612     Blood Culture, Routine No growth after 5 days.    Narrative:       Aerobic and anaerobic    Urine culture [[id number]]  (Susceptibility) Collected:  09/11/18 2200    Order Status:  Completed Specimen:  Urine Updated:  09/14/18 1323     Urine Culture, Routine --     ESCHERICHIA COLI  50,000 - 99,999 cfu/ml      Narrative:       Preferred Collection Type->Urine, Catheterized         CXR: Perihilar and basal pleural and parenchymal changes.  Asymmetric CHF and pneumonia could both yield this appearance.    CT urogram:  Small volume of ascites, small bilateral pleural effusions and posterior bibasilar atelectasis.  Extensive subcutaneous fat stranding and fluid.  Diverticulosis without CT findings of acute diverticulitis.  Renal scarring.  Additional findings as detailed above including IVC filter, osseous degenerative change.    CXR:   Appropriately positioned right PICC line.  Cardiomegaly.  Right basilar opacification representing consolidation and/or pleural effusion.    CXR: 1. Persistent congestive heart failure not significantly changed over the interval.  2. Persistent small bilateral pleural effusions with atelectasis versus infiltrate at the lung bases.  3. Right subclavian PICC line.    Assessment/Plan:     * Severe sepsis     Secondary to UTI.  Associated with lactic acidosis and leukocytosis-now resolved.   Continue Cefepime.  Stop maintenance fluids  Remains with labile blood pressures although no evidence of worsening infection and antibitoics are culture appropriate.        Encephalopathy, metabolic     Acute  Secondary to UTI  Mild improvement after IV fluids  Continue to monitor closely        Stage 2 chronic kidney disease     Creatinine at baseline  Will trend daily        Glaucoma     Chronic, stable  Continue BB eye gtts       Acquired hypothyroidism     Chronic  Continue Synthroid       Chronic diastolic heart failure     No evidence of  exacerbation   Total body volume overloaded however  Lasix 40 BID      Bradycardia   Atrial fibrillation with slow ventricular response.  Off BB and Digoxin. Follow cardiology recommendations.     Essential hypertension     Currently on low side of normal- as above  Hold anti-hypertensive's   Monitor and treat accordingly        Acute cystitis with hematuria with E. Coli sp     Empiric coverage with cefepime  Dr. Holman is following, intermittent bladder irrigation, outpatient cystoscopy planned.       History of pulmonary embolism     Continue Lovenox 1 mg/kg sq q day.     DVT prophylaxis: Use SCD and TEDs. On Lovenox.    Code status: DNR.    Erin Silver MD  Department of Hospital Medicine   [facility]

## 2018-09-19 NOTE — PLAN OF CARE
Problem: Patient Care Overview  Goal: Plan of Care Review  Outcome: Ongoing (interventions implemented as appropriate)  Confused and agitated when awake.  Pt pulled PICC line out early am. Haldol 5 mg IM x 1 given. Soft wrist restraints in use.   Rested well after dose.  Respirations shallow and sarah at times.  JESSI Frost RN was able to place #20 in RUE with ultrasound guidance.  K-Phos and Mag replacement started once new iv site was obtained.  Mckeon intact. Good urine output. Receiving Lasix BID. Generalized edema. Safety maintained.

## 2018-09-19 NOTE — NURSING
[id number]  Multiple attempts made by myself, GEMMA mathew RN, and GIO Chery, RN to start new IV.  US guided attempts made also without success.      [id number]  Resting quietly now. Respirations shallow.  Dr. Silver on unit and aware of no IV access.  Orders to have another PICC placed or midline cath.  Message left for PICC team.  Awaiting return call.

## 2018-09-19 NOTE — PLAN OF CARE
09/18/18 1915   Patient Assessment/Suction   Level of Consciousness (AVPU) alert   Respiratory Effort Normal;Unlabored   Expansion/Accessory Muscles/Retractions expansion symmetric;no retractions;no use of accessory muscles   All Lung Fields Breath Sounds diminished   Cough Type nonproductive;loose   PRE-TX-O2-ETCO2   O2 Device (Oxygen Therapy) nasal cannula   Flow (L/min) 3  (decreased to 3lpm @ this time)   Oxygen Concentration (%) 32   SpO2 100 %   Pulse Oximetry Type Continuous   Pulse 79   Resp 16   Aerosol Therapy   $ Aerosol Therapy Charges Aerosol Treatment   Respiratory Treatment Status given   SVN/Inhaler Treatment Route mask;with oxygen   Position During Treatment HOB at 45 degrees   Patient Tolerance good   Post-Treatment   Post-treatment Heart Rate (beats/min) 95   Post-treatment Resp Rate (breaths/min) 18   All Fields Breath Sounds unchanged   Ready to Wean/Extubation Screen   FIO2<=50 (chart decimal) 0.32

## 2018-09-19 NOTE — NURSING
The patient has become very agitated and pulling off her nasal cannula and pulse ox. Multiple attempts to redirect her and replace the nasal cannula and sensor but she began attempting to bite and hit the staff at her bedside. Lny johnson.

## 2018-09-19 NOTE — PLAN OF CARE
Problem: Patient Care Overview  Goal: Plan of Care Review  Patient free of falls and injuries this shift.Remains confused but calm.  A-fib/flutter on monitor. 3-4+ edema generalized over all body. Mckeon patent with yellow urine noted. Calmoseptine applied to laverne area redness.Tolerated well.

## 2018-09-19 NOTE — NURSING
"0655  Report received.  Pt agitated and yelling out. "Get satan out of there room."   Pulled right arm PICC line out earlier. Pressure dressing on. Bruising noted.  Soft wrist restraint placed.  Attempted to reassure. Continue ot monitor.     4722 Notified Dr. Silver of continued agitation and no IV access. New orders received for Haldol 5 mg IM x 1 dose. Administered in right thigh.   "

## 2018-09-19 NOTE — PLAN OF CARE
Problem: Patient Care Overview  Goal: Plan of Care Review  Outcome: Ongoing (interventions implemented as appropriate)  Pt on 3L NC with Q6 duoneb treatments.

## 2018-09-20 PROBLEM — E43 SEVERE MALNUTRITION: Status: ACTIVE | Noted: 2018-09-20

## 2018-09-20 LAB
BACTERIA #/AREA URNS HPF: ABNORMAL /HPF
BILIRUB UR QL STRIP: NEGATIVE
CLARITY UR: CLEAR
COLOR UR: YELLOW
GLUCOSE UR QL STRIP: NEGATIVE
HGB UR QL STRIP: ABNORMAL
HYALINE CASTS #/AREA URNS LPF: 4 /LPF
KETONES UR QL STRIP: NEGATIVE
LEUKOCYTE ESTERASE UR QL STRIP: ABNORMAL
MICROSCOPIC COMMENT: ABNORMAL
NITRITE UR QL STRIP: NEGATIVE
PH UR STRIP: 6 [PH] (ref 5–8)
POCT GLUCOSE: 121 MG/DL (ref 70–110)
POCT GLUCOSE: 36 MG/DL (ref 70–110)
POCT GLUCOSE: 37 MG/DL (ref 70–110)
POCT GLUCOSE: 53 MG/DL (ref 70–110)
POCT GLUCOSE: 55 MG/DL (ref 70–110)
POCT GLUCOSE: 67 MG/DL (ref 70–110)
POCT GLUCOSE: 69 MG/DL (ref 70–110)
PROT UR QL STRIP: NEGATIVE
RBC #/AREA URNS HPF: 2 /HPF (ref 0–4)
SP GR UR STRIP: 1.01 (ref 1–1.03)
SQUAMOUS #/AREA URNS HPF: 10 /HPF
URN SPEC COLLECT METH UR: ABNORMAL
UROBILINOGEN UR STRIP-ACNC: NEGATIVE EU/DL
WBC #/AREA URNS HPF: 8 /HPF (ref 0–5)

## 2018-09-20 PROCEDURE — 25000242 PHARM REV CODE 250 ALT 637 W/ HCPCS: Performed by: INTERNAL MEDICINE

## 2018-09-20 PROCEDURE — 25000003 PHARM REV CODE 250: Performed by: NURSE PRACTITIONER

## 2018-09-20 PROCEDURE — 63600175 PHARM REV CODE 636 W HCPCS: Performed by: INTERNAL MEDICINE

## 2018-09-20 PROCEDURE — 27000221 HC OXYGEN, UP TO 24 HOURS

## 2018-09-20 PROCEDURE — 63600175 PHARM REV CODE 636 W HCPCS: Performed by: NURSE PRACTITIONER

## 2018-09-20 PROCEDURE — 25000003 PHARM REV CODE 250: Performed by: INTERNAL MEDICINE

## 2018-09-20 PROCEDURE — 94761 N-INVAS EAR/PLS OXIMETRY MLT: CPT

## 2018-09-20 PROCEDURE — 97803 MED NUTRITION INDIV SUBSEQ: CPT

## 2018-09-20 PROCEDURE — 20000000 HC ICU ROOM

## 2018-09-20 PROCEDURE — 81000 URINALYSIS NONAUTO W/SCOPE: CPT

## 2018-09-20 PROCEDURE — A4216 STERILE WATER/SALINE, 10 ML: HCPCS | Performed by: INTERNAL MEDICINE

## 2018-09-20 PROCEDURE — 99233 SBSQ HOSP IP/OBS HIGH 50: CPT | Mod: ,,, | Performed by: INTERNAL MEDICINE

## 2018-09-20 PROCEDURE — 94640 AIRWAY INHALATION TREATMENT: CPT

## 2018-09-20 RX ORDER — DEXTROSE MONOHYDRATE 50 MG/ML
INJECTION, SOLUTION INTRAVENOUS CONTINUOUS
Status: DISCONTINUED | OUTPATIENT
Start: 2018-09-20 | End: 2018-09-24 | Stop reason: HOSPADM

## 2018-09-20 RX ADMIN — DEXTROSE: 5 SOLUTION INTRAVENOUS at 02:09

## 2018-09-20 RX ADMIN — FUROSEMIDE 40 MG: 10 INJECTION, SOLUTION INTRAVENOUS at 06:09

## 2018-09-20 RX ADMIN — DEXTROSE MONOHYDRATE 12.5 G: 25 INJECTION, SOLUTION INTRAVENOUS at 01:09

## 2018-09-20 RX ADMIN — CEFEPIME HYDROCHLORIDE 1 G: 1 INJECTION, POWDER, FOR SOLUTION INTRAMUSCULAR; INTRAVENOUS at 03:09

## 2018-09-20 RX ADMIN — DEXTROSE MONOHYDRATE 25 G: 25 INJECTION, SOLUTION INTRAVENOUS at 01:09

## 2018-09-20 RX ADMIN — IPRATROPIUM BROMIDE AND ALBUTEROL SULFATE 3 ML: .5; 3 SOLUTION RESPIRATORY (INHALATION) at 01:09

## 2018-09-20 RX ADMIN — IPRATROPIUM BROMIDE AND ALBUTEROL SULFATE 3 ML: .5; 3 SOLUTION RESPIRATORY (INHALATION) at 07:09

## 2018-09-20 RX ADMIN — Medication 10 ML: at 06:09

## 2018-09-20 RX ADMIN — CEFEPIME HYDROCHLORIDE 1 G: 1 INJECTION, POWDER, FOR SOLUTION INTRAMUSCULAR; INTRAVENOUS at 02:09

## 2018-09-20 RX ADMIN — DEXTROSE MONOHYDRATE 12.5 G: 25 INJECTION, SOLUTION INTRAVENOUS at 04:09

## 2018-09-20 RX ADMIN — ENOXAPARIN SODIUM 130 MG: 150 INJECTION SUBCUTANEOUS at 05:09

## 2018-09-20 RX ADMIN — Medication 10 ML: at 11:09

## 2018-09-20 RX ADMIN — FUROSEMIDE 40 MG: 10 INJECTION, SOLUTION INTRAVENOUS at 09:09

## 2018-09-20 RX ADMIN — ENOXAPARIN SODIUM 130 MG: 150 INJECTION SUBCUTANEOUS at 06:09

## 2018-09-20 NOTE — PROGRESS NOTES
" [facility]  Adult Nutrition  Progress Note    SUMMARY       Problem: Patient Care Overview  Goal: Individualization & Mutuality  Recommendation:  1) Pt's PO intakes remain < 25% of meals and supplements > 5 days.  Recommend supplemental TF if consistent with GOC  Isosource 1.5 @ 20 ml/hr advancing to goal of 40 ml/hr + 135 ml flush q 4 hr (Meeting 87% kcal, 100 protein and 100% fluid needs)    1) Continue cardiac diet with 1:1 feeds- add DM restriction only if glucose becomes elevated  2) Send Boost Plus, BID   Intervention:      Goals: Pt will consume/receive >=75% EEN by RD f/u.   Nutrition Goal Status: Continues  Communication of RD Recs: (RN, second sign)  Reason for Assessment     Reason for Assessment: consult  1. Acute UTI    2. Bradycardia    3. Severe sepsis    4. Altered mental status, unspecified altered mental status type    5. Hematuria, unspecified type            Past Medical History:   Diagnosis Date    Chronic diastolic heart failure 9/12/2018    Hypertension      Pulmonary embolism      Sleep apnea       on CPAP    Stage 2 chronic kidney disease 9/12/2018         General Information Comments: Admitted with AMS, hematuria. Pt more alert per notes but was sleeping at time of visit. Weight gain PTA noted, unable to determine PO intake PTA. NFPE done 9/13, f/u patient looks nourished no visible muscle/fat loss. Patient tolerating diet but with poor appetite, only taking small bites of food, since f/u (1:1 feed).      Nutrition Discharge Planning: to be determined     Nutrition Risk Screen- Needs assistance with feedings     Nutrition/Diet History     Do you have any cultural, spiritual, Synagogue conflicts, given your current situation?: none     Anthropometrics     Temp: 96.3 °F (35.7 °C)  Height Method: Estimated  Height: 5' 6"  Height (inches): 66 in  Weight Method: Bed Scale  Weight: 128.7 kg (wt fluctuations 108.9-128.7 kg since admission, of note pt on diuretics likely " r/t fluid shifts)  Ideal Body Weight (IBW), Female: 130 lb  % Ideal Body Weight, Female (lb): 194.68 lb  BMI (Calculated): 40.9  Usual Body Weight (UBW), k.4 kg(per chart review 2/15/18)  % Usual Body Weight: 144.89  % Weight Change From Usual Weight: 44.58 %     Lab/Procedures/Meds     Pertinent Labs Reviewed: reviewed  BMP  Lab Results   Component Value Date     2018    K 3.5 2018    CL 97 2018    CO2 34 (H) 2018    BUN 35 (H) 2018    CREATININE 0.9 2018    CALCIUM 8.9 2018    ANIONGAP 8 2018    ESTGFRAFRICA >60 2018    EGFRNONAA >60 2018        Pertinent Medications:  furosemide     Physical Findings/Assessment     Oral/Mouth Cavity: tooth/teeth missing  Skin: edema(Julio score 11, no PI noted)     Estimated/Assessed Needs     Weight Used For Calorie Calculations: 114.8 kg (253 lb 1.4 oz)  Energy Calorie Requirements (kcal): 1640 (no AF required when BMI >25)  Energy Need Method: [place]  Protein Requirements:  (1.2-2.0 gm/kg/day)  Weight Used For Protein Calculations: 59 kg (130 lb)  Fluid Need Method: RDA Method  RDA Method (mL): 1640  CHO Requirement: n/a        Nutrition Prescription Ordered     Current Diet Order: cardiac     Evaluation of Received Nutrient/Fluid Intake     Energy Calories Required: Not meeting needs  Protein Required: Not meeting needs  Fluid Required: Not meeting needs       % Intake of Estimated Energy Needs: < 25%  % Meal Intake: 0-25%       Nutrition Risk     Level of Risk/Frequency of Follow-up: (2 x wkly)      Assessment and Plan     Inadequate energy intake r/t altered mental status as evidenced by       Monitor and Evaluation     Food and Nutrient Intake: energy intake  Food and Nutrient Adminstration: diet order  Physical Activity and Function: nutrition-related ADLs and IADLs  Anthropometric Measurements: weight, weight change  Biochemical Data, Medical Tests and Procedures: electrolyte and renal  panel, inflammatory profile  Nutrition-Focused Physical Findings: skin      Nutrition Follow-Up  Yes    Ursula Sanchez RDN, LDN

## 2018-09-20 NOTE — ASSESSMENT & PLAN NOTE
Contributing Nutrition Diagnosis  In context of acute illness    Related to (etiology):   Decreased appetite and refusing food    Signs and Symptoms (as evidenced by):   1) <50% EEN >5 days  2) Edema 3+ generalized, 4+ dependent per flowsheet    Interventions/Recommendations (treatment strategy):  Depending on POC, recommend enteral feeding    Nutrition Diagnosis Status:   New

## 2018-09-20 NOTE — PLAN OF CARE
09/19/18 1925   Patient Assessment/Suction   Level of Consciousness (AVPU) alert   Respiratory Effort Normal;Unlabored   Expansion/Accessory Muscles/Retractions expansion symmetric;no retractions;no use of accessory muscles   All Lung Fields Breath Sounds diminished   PRE-TX-O2-ETCO2   O2 Device (Oxygen Therapy) nasal cannula   Flow (L/min) 3   Oxygen Concentration (%) 32   SpO2 99 %   Pulse Oximetry Type Continuous   Pulse 99   Resp 20   Aerosol Therapy   $ Aerosol Therapy Charges Aerosol Treatment   Respiratory Treatment Status given   SVN/Inhaler Treatment Route mask;with oxygen   Position During Treatment HOB at 45 degrees   Patient Tolerance good   Post-Treatment   Post-treatment Heart Rate (beats/min) 84   Post-treatment Resp Rate (breaths/min) 16   All Fields Breath Sounds unchanged   Ready to Wean/Extubation Screen   FIO2<=50 (chart decimal) 0.32

## 2018-09-20 NOTE — PLAN OF CARE
Problem: Patient Care Overview  Goal: Plan of Care Review  Still hallucinating and delusional with periods of cursing. Desats to high 80's at times when asleep and mouth breathing. A-fib rhythm rate 80-90. Diuresing well per fishman.

## 2018-09-20 NOTE — PROGRESS NOTES
[facility]  Adult Nutrition  Progress Note    SUMMARY       Problem: Patient Care Overview  Goal: Individualization & Mutuality  Recommendation  1) Pt's PO intakes remain < 25% of meals and supplements > 5 days.  Recommend supplemental TF if consistent with GOC  Isosource 1.5 @ 20 ml/hr advancing to goal of 40 ml/hr + 135 ml flush q 4 hr (Meeting 87% kcal, 100 protein and 100% fluid needs)  2) Provide diet with relaxed restriction for pleasure feeds + Boost Plus with 1:1 feeds.  Intervention:  3) Severe Malnutrition identified per ASPEN guidelines with poor intake.  Please acknowledge in problem list if you agree.      Goals: Pt will consume/receive >=75% EEN by RD f/u.   Nutrition Goal Status: Continues  Communication of RD Recs: (POC, sticky note)  Reason for Assessment     Reason for Assessment: consult  1. Acute UTI    2. Bradycardia    3. Severe sepsis    4. Altered mental status, unspecified altered mental status type    5. Hematuria, unspecified type            Past Medical History:   Diagnosis Date    Chronic diastolic heart failure 9/12/2018    Hypertension      Pulmonary embolism      Sleep apnea       on CPAP    Stage 2 chronic kidney disease 9/12/2018         General Information Comments: Admitted with AMS, hematuria. Pt more alert per notes but was sleeping at time of visit. Weight gain PTA noted, unable to determine PO intake PTA. NFPE done 9/13, f/u patient looks nourished no visible muscle/fat loss. Patient tolerating diet but with poor appetite, only taking small bites of food, since f/u (1:1 feed).   9/20/18: Pt sleeping and unable to arouse.  Attempted to contact RN x 3 and was involved in pt care.       Nutrition Discharge Planning: to be determined     Nutrition Risk Screen- Needs assistance with feedings     Nutrition/Diet History     Do you have any cultural, spiritual, Spiritism conflicts, given your current situation?: none     Anthropometrics     Temp: 96.3 °F  "(35.7 °C)  Height Method: Estimated  Height: 5' 6"  Height (inches): 66 in  Weight Method: Bed Scale  Weight: 128.7 kg (wt fluctuations 108.9-128.7 kg since admission, of note pt on diuretics likely r/t fluid shifts)  Ideal Body Weight (IBW), Female: 130 lb  % Ideal Body Weight, Female (lb): 194.68 lb  BMI (Calculated): 40.9  Usual Body Weight (UBW), k.4 kg(per chart review 2/15/18)  % Usual Body Weight: 144.89  % Weight Change From Usual Weight: 44.58 %     Lab/Procedures/Meds     Pertinent Labs Reviewed: reviewed  BMP  Lab Results   Component Value Date     2018    K 3.5 2018    CL 97 2018    CO2 34 (H) 2018    BUN 35 (H) 2018    CREATININE 0.9 2018    CALCIUM 8.9 2018    ANIONGAP 8 2018    ESTGFRAFRICA >60 2018    EGFRNONAA >60 2018        Pertinent Medications:  furosemide     Physical Findings/Assessment     Oral/Mouth Cavity: tooth/teeth missing  Skin: edema(Julio score 11, no PI noted)     Estimated/Assessed Needs     Weight Used For Calorie Calculations: 114.8 kg (253 lb 1.4 oz)  Energy Calorie Requirements (kcal): 1640 (no AF required when BMI >25)  Energy Need Method: [place]- Micheleor  Protein Requirements:  (1.2-2.0 gm/kg/day)  Weight Used For Protein Calculations: 59 kg (130 lb)  Fluid Need Method: RDA Method  RDA Method (mL): 1640  CHO Requirement: n/a        Nutrition Prescription Ordered     Current Diet Order: cardiac     Evaluation of Received Nutrient/Fluid Intake     Energy Calories Required: Not meeting needs  Protein Required: Not meeting needs  Fluid Required: Not meeting needs       % Intake of Estimated Energy Needs: < 25%  % Meal Intake: 0-25%       Nutrition Risk     Level of Risk/Frequency of Follow-up: (2 x wkly)      Assessment and Plan  Severe malnutrition  [x]  Unprioritized   Change Dx Resolve      Current Assessment & Plan Note  Edited:  Deidra Hernández RD  2018  Contributing Nutrition Diagnosis  In " context of acute illness     Related to (etiology):   Decreased appetite and refusing food     Signs and Symptoms (as evidenced by):   1) <50% EEN >5 days  2) Edema 3+ generalized, 4+ dependent per flowsheet     Interventions/Recommendations (treatment strategy):  Depending on POC, recommend enteral feeding     Nutrition Diagnosis Status:   New                    Inadequate energy intake r/t altered mental status as evidenced by  0-25% intake.       Monitor and Evaluation     Food and Nutrient Intake: energy intake  Food and Nutrient Adminstration: diet order  Physical Activity and Function: nutrition-related ADLs and IADLs  Anthropometric Measurements: weight, weight change  Biochemical Data, Medical Tests and Procedures: electrolyte and renal panel, inflammatory profile  Nutrition-Focused Physical Findings: skin      Nutrition Follow-Up  Yes

## 2018-09-20 NOTE — PLAN OF CARE
Problem: Patient Care Overview  Goal: Plan of Care Review  Outcome: Ongoing (interventions implemented as appropriate)  Pt on 3L NC with Q6 duoneb treatments

## 2018-09-20 NOTE — PROGRESS NOTES
Progress Note  Hospital Medicine  Patient Name:Marjorie Macario  MRN:  [id number]  Patient Class: IP- Inpatient  Admit Date: 9/11/2018  Length of Stay: 8 days  Expected Discharge Date:   Attending Physician: Erin Silver MD  Primary Care Provider:  Sotero Le MD    SUBJECTIVE:     Principal Problem: Severe sepsis  Initial history of present illness: Pt was sent from [facility] for hematuria and altered mental status. Pt was difficult to arouse, now after IV fluids, awake and talking unintelligible. Unable to obtain history and ROS from pt due to encephalopathy. Records did not offer any up to date information. In July, pt was treated for septic shock due to UTI at [facility]. Pt required pressors along with mechanical ventilation at that time.     PMH/PSH/SH/FH/Meds: reviewed.    Symptoms/Review of Systems: In ICU, HD stable. Remains on abx. Patient not eating, blood sugars dropping.  Diet: Poor  Activity level: Up with assist   Pain: NAD      OBJECTIVE:   Vital Signs (Most Recent):      Temp: 97.5 °F (36.4 °C) (09/20/18 0400)  Pulse: 91 (09/20/18 0739)  Resp: 14 (09/20/18 0739)  BP: (!) 161/82 (09/20/18 0500)  SpO2: 100 % (09/20/18 0739)       Vital Signs Range (Last 24H):  Temp:  [97.2 °F (36.2 °C)-97.6 °F (36.4 °C)]   Pulse:  [68-99]   Resp:  [10-27]   BP: ()/(58-85)   SpO2:  [90 %-100 %]     Weight: 116.4 kg (256 lb 9.9 oz)  Body mass index is 41.42 kg/m².    Intake/Output Summary (Last 24 hours) at 9/20/2018 0841  Last data filed at 9/20/2018 0600  Gross per 24 hour   Intake 760 ml   Output 3346 ml   Net -2586 ml     Physical Examination:  Constitutional: She is oriented to person, place, and time. No distress.   HENT:   Head: Normocephalic and atraumatic.   Eyes: Conjunctivae are normal. Right eye exhibits no discharge. Left eye exhibits no discharge. No scleral icterus.   Neck: Normal range of motion. Neck supple. No JVD present. No thyromegaly present.   Cardiovascular: Normal rate and regular rhythm.  Exam reveals no gallop and no friction rub.   Murmur heard.  Pulmonary/Chest: Effort normal and breath sounds normal. No respiratory distress. She has no wheezes. She has no rales. She exhibits no tenderness.   Abdominal: Soft. Bowel sounds are normal. She exhibits no distension. There is no tenderness. There is no rebound and no guarding.   Musculoskeletal: She exhibits no edema or tenderness. 1+ pitting edema.  Lymphadenopathy:     She has no cervical adenopathy.   Neurological: She is alert and oriented to person, place, and time. No cranial nerve deficit.   Skin: Skin is dry. Capillary refill takes less than 2 seconds. She is not diaphoretic.   Pretibial bandage removed-small ulcer with a clean base noted.       CBC:  Recent Labs   Lab  09/17/18 0327 09/18/18   0305  09/19/18   0329   WBC  9.10  8.90  10.70   RBC  3.81*  4.00  4.19   HGB  8.3*  8.7*  9.4*   HCT  27.6*  28.8*  30.5*   PLT  269  315  282   MCV  72*  72*  73*   MCH  21.7*  21.7*  22.4*   MCHC  29.9*  30.0*  30.7*   BMP  Recent Labs   Lab  09/17/18   0327 09/18/18   0304  09/19/18   0329   GLU  161*  107  99   NA  136  139  139   K  4.0  3.6  3.5   CL  97  98  97   CO2  29  35*  34*   BUN  51*  44*  35*   CREATININE  1.4  1.2  0.9   CALCIUM  8.5*  8.7  8.9   MG  1.6  1.6  1.5*      Diagnostic Results:  Microbiology Results (last 7 days)     Procedure Component Value Units Date/Time    Blood culture x two cultures. Draw prior to antibiotics. [[id number]] Collected:  09/11/18 2151    Order Status:  Completed Specimen:  Blood Updated:  09/17/18 0612     Blood Culture, Routine No growth after 5 days.    Narrative:       Aerobic and anaerobic    Blood culture x two cultures. Draw prior to antibiotics. [[id number]] Collected:  09/11/18 2151    Order Status:  Completed Specimen:  Blood Updated:  09/17/18 0612     Blood Culture, Routine No growth after 5 days.    Narrative:       Aerobic and anaerobic    Urine culture [[id number]]  (Susceptibility)  Collected:  09/11/18 2200    Order Status:  Completed Specimen:  Urine Updated:  09/14/18 1323     Urine Culture, Routine --     ESCHERICHIA COLI  50,000 - 99,999 cfu/ml      Narrative:       Preferred Collection Type->Urine, Catheterized         CXR: Perihilar and basal pleural and parenchymal changes.  Asymmetric CHF and pneumonia could both yield this appearance.    CT urogram:  Small volume of ascites, small bilateral pleural effusions and posterior bibasilar atelectasis.  Extensive subcutaneous fat stranding and fluid.  Diverticulosis without CT findings of acute diverticulitis.  Renal scarring.  Additional findings as detailed above including IVC filter, osseous degenerative change.    CXR:   Appropriately positioned right PICC line.  Cardiomegaly.  Right basilar opacification representing consolidation and/or pleural effusion.    CXR: 1. Persistent congestive heart failure not significantly changed over the interval.  2. Persistent small bilateral pleural effusions with atelectasis versus infiltrate at the lung bases.  3. Right subclavian PICC line.    Assessment/Plan:     * Severe sepsis     Secondary to UTI.  Associated with lactic acidosis and leukocytosis-now resolved.   Continue Cefepime.  Remains with labile blood pressures although no evidence of worsening infection and antibitoics are culture appropriate.   Repeat UA.       Encephalopathy, metabolic     Acute  Secondary to UTI  Mild improvement after IV fluids  Continue to monitor closely        Stage 2 chronic kidney disease     Creatinine at baseline  Will trend daily       Hypoglycemia  Start gentle IV dextrose to keep blood glucose up.      Glaucoma     Chronic, stable  Continue BB eye gtts       Acquired hypothyroidism     Chronic  Continue Synthroid       Chronic diastolic heart failure     No evidence of exacerbation   Total body volume overloaded however  Lasix 40 BID      Bradycardia   Atrial fibrillation with slow ventricular response.  Off  BB and Digoxin. Follow cardiology recommendations.     Essential hypertension     Currently on low side of normal- as above  Hold anti-hypertensive's   Monitor and treat accordingly        Acute cystitis with hematuria with E. Coli sp     Empiric coverage with cefepime  Dr. Holman is following, intermittent bladder irrigation, outpatient cystoscopy planned.       History of pulmonary embolism     Continue Lovenox 1 mg/kg sq q day.     DVT prophylaxis: Use SCD and TEDs. On Lovenox.    Code status: DNR.    Requested family meeting to discuss future plan of care.     Erin Silver MD  Department of Hospital Medicine   [facility]

## 2018-09-20 NOTE — PROGRESS NOTES
Spoke with Shereen, daughter. Informed her that Dr. Silver would like to have a family meeting. Shereen stated that her brother will be in from out of town [facility] and would like the meeting to be tomorrow. Will inform Dr. Silver.

## 2018-09-20 NOTE — PROGRESS NOTES
Dr. Silver notified of low blood sugars, order received.  Notified of labs unsuccessful attempts to draw blood.   Also updated on family meeting.

## 2018-09-20 NOTE — NURSING
Consulted for MIDLINE placement- after assessment of vasculature- concerned that vessels will not support entire length of cath- although in soft restraints pt is uncooperative. Placed 20g 1.75 cm PIV to pts right upper arm with the use of ultrasound guidance. Blood return present- flushing without difficulty. Secured in place.

## 2018-09-20 NOTE — PROGRESS NOTES
[facility]  Cardiology  Progress Note    Patient Name: Marjorie Macario  MRN: [id number]  Admission Date: 9/11/2018  Hospital Length of Stay: 8 days  Code Status: DNR   Attending Physician: Erin Silver MD   Primary Care Physician: Sotero Le MD  Expected Discharge Date:   Principal Problem:Severe sepsis    Subjective:     Hospital Course: patient general condition not improving , not eating or moving    Interval History: cardiacwise stable    Review of Systems   Constitution: Positive for decreased appetite, weakness, malaise/fatigue and weight gain. Negative for fever.   HENT: Negative.    Eyes: Negative.    Cardiovascular: Negative.    Respiratory: Positive for shortness of breath.    Endocrine: Negative.    Skin: Negative.    Musculoskeletal: Negative.      Objective:     Vital Signs (Most Recent):  Temp: 97.3 °F (36.3 °C) (09/20/18 1200)  Pulse: 105 (09/20/18 1400)  Resp: (!) 24 (09/20/18 1400)  BP: (!) 141/67 (09/20/18 1400)  SpO2: 97 % (09/20/18 1400) Vital Signs (24h Range):  Temp:  [96.3 °F (35.7 °C)-97.5 °F (36.4 °C)] 97.3 °F (36.3 °C)  Pulse:  [] 105  Resp:  [12-27] 24  SpO2:  [92 %-100 %] 97 %  BP: (120-161)/() 141/67     Weight: 116.4 kg (256 lb 9.9 oz)  Body mass index is 41.42 kg/m².    SpO2: 97 %  O2 Device (Oxygen Therapy): nasal cannula      Intake/Output Summary (Last 24 hours) at 9/20/2018 1803  Last data filed at 9/20/2018 1600  Gross per 24 hour   Intake 156 ml   Output 3701 ml   Net -3545 ml       Lines/Drains/Airways     Drain                 Urethral Catheter 09/12/18 0020 8 days          Peripheral Intravenous Line                 Peripheral IV - Single Lumen 09/19/18 1500 Right Upper Arm 1 day                Physical Exam   Constitutional: She appears well-developed.   HENT:   Head: Normocephalic.   Eyes: Pupils are equal, round, and reactive to light.   Neck: Normal range of motion.   Cardiovascular: An irregularly irregular rhythm present.    Pulmonary/Chest: Effort normal.       Significant Labs:   CMP   Recent Labs   Lab  09/19/18   0329   NA  139   K  3.5   CL  97   CO2  34*   GLU  99   BUN  35*   CREATININE  0.9   CALCIUM  8.9   ANIONGAP  8   ESTGFRAFRICA  >60   EGFRNONAA  >60   , CBC   Recent Labs   Lab  09/19/18   0329   WBC  10.70   HGB  9.4*   HCT  30.5*   PLT  282   , Lipid Panel No results for input(s): CHOL, HDL, LDLCALC, TRIG, CHOLHDL in the last 48 hours. and Troponin No results for input(s): TROPONINI in the last 48 hours.    Significant Imaging: X-Ray: CXR: X-Ray Chest 1 View (CXR):   Results for orders placed or performed during the hospital encounter of 09/11/18   X-Ray Chest 1 View    Narrative    EXAMINATION:  XR CHEST 1 VIEW    CLINICAL HISTORY:  cough;    TECHNIQUE:  Single frontal view of the chest was performed.    COMPARISON:  Chest x-ray 09/13/2018.    FINDINGS:  There is again cardiomegaly and interstitial edema consistent with congestive heart failure.  This is not significantly changed over the interval.  There are persistent small bilateral pleural effusions with atelectasis versus infiltrate at the lung bases.    Right subclavian PICC line remains in satisfactory position.      Impression    1. Persistent congestive heart failure not significantly changed over the interval.  2. Persistent small bilateral pleural effusions with atelectasis versus infiltrate at the lung bases.  3. Right subclavian PICC line.      Electronically signed by: Terry Quintana  Date:    09/15/2018  Time:    13:06     Assessment and Plan:     Brief HPI:     Active Diagnoses:    Diagnosis Date Noted POA    PRINCIPAL PROBLEM:  Severe sepsis [A41.9, R65.20] 09/12/2018 Yes    Severe malnutrition [E43] 09/20/2018 Unknown    History of pulmonary embolism [Z86.711] 09/12/2018 Yes    Acute cystitis with hematuria [N30.01] 09/12/2018 Yes    Essential hypertension [I10] 09/12/2018 Yes    Chronic diastolic heart failure [I50.32] 09/12/2018 Yes     Acquired hypothyroidism [E03.9] 09/12/2018 Yes    Glaucoma [H40.9] 09/12/2018 Yes    Stage 2 chronic kidney disease [N18.2] 09/12/2018 Yes    Encephalopathy, metabolic [G93.41] 09/12/2018 Yes      Problems Resolved During this Admission:       VTE Risk Mitigation (From admission, onward)        Ordered     enoxaparin injection 130 mg  Every 12 hours (non-standard times)      09/18/18 1621     Place sequential compression device  Until discontinued      09/12/18 1622          Chema Lunsford MD  Cardiology  [facility]

## 2018-09-20 NOTE — PLAN OF CARE
Problem: Nutrition, Imbalanced: Inadequate Oral Intake (Adult)  Intervention: Promote/Optimize Nutrition  Recommendation  1) Pt's PO intakes remain < 25% of meals and supplements > 5 days.  Recommend supplemental TF if consistent with GOC  Isosource 1.5 @ 20 ml/hr advancing to goal of 40 ml/hr + 135 ml flush q 4 hr (Meeting 87% kcal, 100 protein and 100% fluid needs)  2) Provide diet with relaxed restriction for pleasure feeds + Boost Plus with 1:1 feeds.  Intervention:  3) Severe Malnutrition identified per ASPEN guidelines with poor intake.  Please acknowledge in problem list if you agree.      Goals: Pt will consume/receive >=75% EEN by RD f/u.   Nutrition Goal Status: Continues  Communication of RD Recs: (POC, sticky note)

## 2018-09-20 NOTE — PROGRESS NOTES
Lab attempted to draw blood, with no success,  showed me what did come out was fluid, pink tinged.

## 2018-09-21 LAB
ANION GAP SERPL CALC-SCNC: 7 MMOL/L
ANISOCYTOSIS BLD QL SMEAR: ABNORMAL
BASOPHILS NFR BLD: 2 %
BUN SERPL-MCNC: 22 MG/DL
CALCIUM SERPL-MCNC: 8.4 MG/DL
CHLORIDE SERPL-SCNC: 92 MMOL/L
CO2 SERPL-SCNC: 41 MMOL/L
CREAT SERPL-MCNC: 0.7 MG/DL
DIFFERENTIAL METHOD: ABNORMAL
EOSINOPHIL NFR BLD: 0 %
ERYTHROCYTE [DISTWIDTH] IN BLOOD BY AUTOMATED COUNT: 24.9 %
EST. GFR  (AFRICAN AMERICAN): >60 ML/MIN/1.73 M^2
EST. GFR  (NON AFRICAN AMERICAN): >60 ML/MIN/1.73 M^2
GLUCOSE SERPL-MCNC: 109 MG/DL
HCT VFR BLD AUTO: 30.4 %
HGB BLD-MCNC: 8.9 G/DL
HYPOCHROMIA BLD QL SMEAR: ABNORMAL
INR PPP: 1.5
LYMPHOCYTES NFR BLD: 18 %
MAGNESIUM SERPL-MCNC: 1.4 MG/DL
MCH RBC QN AUTO: 21.4 PG
MCHC RBC AUTO-ENTMCNC: 29.2 G/DL
MCV RBC AUTO: 73 FL
MONOCYTES NFR BLD: 6 %
NEUTROPHILS NFR BLD: 70 %
NEUTS BAND NFR BLD MANUAL: 4 %
PHOSPHATE SERPL-MCNC: 1.8 MG/DL
PLATELET # BLD AUTO: 430 K/UL
PLATELET BLD QL SMEAR: ABNORMAL
PMV BLD AUTO: 8.9 FL
POCT GLUCOSE: 107 MG/DL (ref 70–110)
POCT GLUCOSE: 123 MG/DL (ref 70–110)
POCT GLUCOSE: 23 MG/DL (ref 70–110)
POCT GLUCOSE: 38 MG/DL (ref 70–110)
POCT GLUCOSE: 40 MG/DL (ref 70–110)
POCT GLUCOSE: 53 MG/DL (ref 70–110)
POCT GLUCOSE: 59 MG/DL (ref 70–110)
POCT GLUCOSE: 68 MG/DL (ref 70–110)
POIKILOCYTOSIS BLD QL SMEAR: SLIGHT
POTASSIUM SERPL-SCNC: 3 MMOL/L
PROTHROMBIN TIME: 14.8 SEC
RBC # BLD AUTO: 4.15 M/UL
SODIUM SERPL-SCNC: 140 MMOL/L
WBC # BLD AUTO: 12.8 K/UL

## 2018-09-21 PROCEDURE — 02HV33Z INSERTION OF INFUSION DEVICE INTO SUPERIOR VENA CAVA, PERCUTANEOUS APPROACH: ICD-10-PCS | Performed by: EMERGENCY MEDICINE

## 2018-09-21 PROCEDURE — 63600175 PHARM REV CODE 636 W HCPCS: Performed by: INTERNAL MEDICINE

## 2018-09-21 PROCEDURE — 25000003 PHARM REV CODE 250: Performed by: NURSE PRACTITIONER

## 2018-09-21 PROCEDURE — 27000221 HC OXYGEN, UP TO 24 HOURS

## 2018-09-21 PROCEDURE — 25000003 PHARM REV CODE 250: Performed by: INTERNAL MEDICINE

## 2018-09-21 PROCEDURE — 36415 COLL VENOUS BLD VENIPUNCTURE: CPT

## 2018-09-21 PROCEDURE — 25000242 PHARM REV CODE 250 ALT 637 W/ HCPCS: Performed by: INTERNAL MEDICINE

## 2018-09-21 PROCEDURE — C1751 CATH, INF, PER/CENT/MIDLINE: HCPCS

## 2018-09-21 PROCEDURE — 99233 SBSQ HOSP IP/OBS HIGH 50: CPT | Mod: ,,, | Performed by: INTERNAL MEDICINE

## 2018-09-21 PROCEDURE — 20000000 HC ICU ROOM

## 2018-09-21 PROCEDURE — 85027 COMPLETE CBC AUTOMATED: CPT

## 2018-09-21 PROCEDURE — 63600175 PHARM REV CODE 636 W HCPCS: Performed by: NURSE PRACTITIONER

## 2018-09-21 PROCEDURE — 83735 ASSAY OF MAGNESIUM: CPT

## 2018-09-21 PROCEDURE — 94761 N-INVAS EAR/PLS OXIMETRY MLT: CPT

## 2018-09-21 PROCEDURE — 94640 AIRWAY INHALATION TREATMENT: CPT

## 2018-09-21 PROCEDURE — 36556 INSERT NON-TUNNEL CV CATH: CPT

## 2018-09-21 PROCEDURE — 85610 PROTHROMBIN TIME: CPT

## 2018-09-21 PROCEDURE — 76937 US GUIDE VASCULAR ACCESS: CPT

## 2018-09-21 PROCEDURE — 84100 ASSAY OF PHOSPHORUS: CPT

## 2018-09-21 PROCEDURE — 85007 BL SMEAR W/DIFF WBC COUNT: CPT

## 2018-09-21 PROCEDURE — 80048 BASIC METABOLIC PNL TOTAL CA: CPT

## 2018-09-21 RX ORDER — DEXTROSE MONOHYDRATE 100 MG/ML
INJECTION, SOLUTION INTRAVENOUS CONTINUOUS
Status: DISCONTINUED | OUTPATIENT
Start: 2018-09-21 | End: 2018-09-24 | Stop reason: HOSPADM

## 2018-09-21 RX ORDER — MAGNESIUM SULFATE 1 G/100ML
1 INJECTION INTRAVENOUS ONCE
Status: COMPLETED | OUTPATIENT
Start: 2018-09-21 | End: 2018-09-21

## 2018-09-21 RX ADMIN — ENOXAPARIN SODIUM 130 MG: 150 INJECTION SUBCUTANEOUS at 05:09

## 2018-09-21 RX ADMIN — DEXTROSE MONOHYDRATE 25 G: 25 INJECTION, SOLUTION INTRAVENOUS at 02:09

## 2018-09-21 RX ADMIN — MAGNESIUM SULFATE HEPTAHYDRATE 1 G: 1 INJECTION, SOLUTION INTRAVENOUS at 09:09

## 2018-09-21 RX ADMIN — FUROSEMIDE 40 MG: 10 INJECTION, SOLUTION INTRAVENOUS at 09:09

## 2018-09-21 RX ADMIN — POTASSIUM PHOSPHATE, MONOBASIC AND POTASSIUM PHOSPHATE, DIBASIC 30 MMOL: 224; 236 INJECTION, SOLUTION INTRAVENOUS at 09:09

## 2018-09-21 RX ADMIN — IPRATROPIUM BROMIDE AND ALBUTEROL SULFATE 3 ML: .5; 3 SOLUTION RESPIRATORY (INHALATION) at 01:09

## 2018-09-21 RX ADMIN — ENOXAPARIN SODIUM 130 MG: 150 INJECTION SUBCUTANEOUS at 06:09

## 2018-09-21 RX ADMIN — DEXTROSE: 10 SOLUTION INTRAVENOUS at 12:09

## 2018-09-21 RX ADMIN — IPRATROPIUM BROMIDE AND ALBUTEROL SULFATE 3 ML: .5; 3 SOLUTION RESPIRATORY (INHALATION) at 07:09

## 2018-09-21 RX ADMIN — IPRATROPIUM BROMIDE AND ALBUTEROL SULFATE 3 ML: .5; 3 SOLUTION RESPIRATORY (INHALATION) at 12:09

## 2018-09-21 RX ADMIN — DEXTROSE MONOHYDRATE 12.5 G: 25 INJECTION, SOLUTION INTRAVENOUS at 01:09

## 2018-09-21 RX ADMIN — TIMOLOL MALEATE 1 DROP: 5 SOLUTION OPHTHALMIC at 09:09

## 2018-09-21 RX ADMIN — Medication 4 G: at 11:09

## 2018-09-21 RX ADMIN — FUROSEMIDE 40 MG: 10 INJECTION, SOLUTION INTRAVENOUS at 06:09

## 2018-09-21 RX ADMIN — LATANOPROST 1 DROP: 50 SOLUTION OPHTHALMIC at 09:09

## 2018-09-21 RX ADMIN — CEFEPIME HYDROCHLORIDE 1 G: 1 INJECTION, POWDER, FOR SOLUTION INTRAMUSCULAR; INTRAVENOUS at 02:09

## 2018-09-21 RX ADMIN — PANTOPRAZOLE SODIUM 40 MG: 40 TABLET, DELAYED RELEASE ORAL at 09:09

## 2018-09-21 NOTE — PROCEDURES - EMERGENCY DEPT.
Was called to the room in 5:00 p.m. and was there for 515 for placement of a right central venous line.  A consent was obtained from the nurse from the family.  I discussed with the patient and the patient agree for the procedure.  Patient has congestive heart failure with atrial fibrillation and needs IV access for cardiovascular monitoring as well as drug infusion.  Patient also has sepsis from urinary tract infection.    The patient was laid down in the supine position and slight retro Trendelenburg.  Right neck was cleaned prepped with Betadine and draped.  I used an ultrasound to locate the internal jugular.  I anesthetized the region 1% lidocaine without epinephrine.  I accessed the vessel using ultrasound-guided technique with 1 attempt.  I used Seldinger technique and placed a triple-lumen catheter with blood return from all ports and flushed with normal saline in a sterile fashion.  Blue antibiotic patch was placed and Tegaderm was placed.  X-ray shows line is in place and there were no complications and no blood loss.

## 2018-09-21 NOTE — PLAN OF CARE
Problem: Patient Care Overview  Goal: Plan of Care Review  A little less angry this shift. Still cursing with any stimuli but did drink 2 cups of lemonade. Still refused pills. Blood sugars high 60's with D5 infusing. Arms still weeping. Mckeon draining good urine output. A Fib on monitor .  to meet with family today to discuss Hospice.

## 2018-09-21 NOTE — PLAN OF CARE
Problem: Patient Care Overview  Goal: Plan of Care Review  Outcome: Ongoing (interventions implemented as appropriate)  Care plan reviewed.  Safety maintained. Restraints in place and monitored per policy.   IV antibiotics for treatment of infection. Afebrile today.  Patient refusing t care/oral care/to eat/to drink/to take meds. Patient did eat a few bites of dinner for her son.  Patient covered three times today with D50 for hypoglycemia.  D5W started at 30 ml per hour.   Meeting with family possibly tomorrow evening with Dr. Silver.

## 2018-09-21 NOTE — EICU
17:30 Called into room for Time out prior to TLC insertion. Dr Jordan at bedside. Time out completed as charted.  17:42 Line in place Positive blood from all 3 ports CXR ordered

## 2018-09-21 NOTE — PLAN OF CARE
"Problem: Patient Care Overview  Goal: Plan of Care Review  Outcome: Ongoing (interventions implemented as appropriate)  Last accucheck was "38" at 1445.  Given 25 gm D50 IVP.  Afterwards, patient began complaining of arm around IV site "burning."  IV examined, flows easily, no redness, edema questionable, as whole arm and body is severely edematous.  No blood return from IV.  IV rate (D10W) decreased from 30 to 5cc/hr.  Will attempt to place another IV.  Family members visiting.  Patient is asymptomatic for hypoglycemia in that she is oriented to family and interacting appropriately with them, even to the point of discussing the food she has eaten today.      "

## 2018-09-21 NOTE — PLAN OF CARE
09/20/18 1921   Patient Assessment/Suction   Level of Consciousness (AVPU) alert   Respiratory Effort Unlabored   All Lung Fields Breath Sounds wheezes, expiratory;diminished;crackles   PRE-TX-O2-ETCO2   O2 Device (Oxygen Therapy) nasal cannula w/ humidification   $ Is the patient on Low Flow Oxygen? Yes   Flow (L/min) 3   SpO2 99 %   Pulse Oximetry Type Continuous   $ Pulse Oximetry - Multiple Charge Pulse Oximetry - Multiple   Pulse (!) 115   Resp (!) 23   Aerosol Therapy   $ Aerosol Therapy Charges Aerosol Treatment   Respiratory Treatment Status given   SVN/Inhaler Treatment Route with oxygen;mask   Position During Treatment HOB at 45 degrees   Patient Tolerance good   Post-Treatment   Post-treatment Heart Rate (beats/min) 108   Post-treatment Resp Rate (breaths/min) 19   All Fields Breath Sounds unchanged

## 2018-09-21 NOTE — PLAN OF CARE
"Problem: Patient Care Overview  Goal: Plan of Care Review  Outcome: Ongoing (interventions implemented as appropriate)  Results for CHINA DREW (MRN [id number]) as of 9/21/2018 12:22   Ref. Range 9/21/2018 11:33   POCT Glucose Latest Ref Range: 70 - 110 mg/dL 40 (LL)       Accucheck = "<40"  via Accucheck at 1135. Is asymptomatic.  Patient able to eat only one glucose tablet, but then followed by lunch, total feed, consuming 75%.  Will recheck CBG.        "

## 2018-09-21 NOTE — PLAN OF CARE
Problem: Patient Care Overview  Goal: Plan of Care Review  Outcome: Ongoing (interventions implemented as appropriate)  Triple lumen IV catheter inserted by Dr. Jordan into right IJ.  Chest x-ray obtained to verify placement.  Consent had been obtained and signed by nicholas Blackmon earlier this afternoon.  According to note by Dr. Jordan, line is in good position for use.

## 2018-09-21 NOTE — PROGRESS NOTES
Progress Note  Hospital Medicine  Patient Name:Marjorie Macario  MRN:  [id number]  Patient Class: IP- Inpatient  Admit Date: 9/11/2018  Length of Stay: 9 days  Expected Discharge Date:   Attending Physician: Erin Silver MD  Primary Care Provider:  Sotero Le MD    SUBJECTIVE:     Principal Problem: Severe sepsis  Initial history of present illness: Pt was sent from [facility] for hematuria and altered mental status. Pt was difficult to arouse, now after IV fluids, awake and talking unintelligible. Unable to obtain history and ROS from pt due to encephalopathy. Records did not offer any up to date information. In July, pt was treated for septic shock due to UTI at [facility]. Pt required pressors along with mechanical ventilation at that time.     PMH/PSH/SH/FH/Meds: reviewed.    Symptoms/Review of Systems: In ICU, more more alert and responsive this am, patient had breakfast. Remains on abx. Blood sugars are better with IV dextrose use.  Diet: Poor  Activity level: Up with assist   Pain: NAD      OBJECTIVE:   Vital Signs (Most Recent):      Temp: 98.6 °F (37 °C) (09/21/18 0400)  Pulse: 83 (09/21/18 0712)  Resp: (!) 22 (09/21/18 0712)  BP: 112/66 (09/21/18 0600)  SpO2: 99 % (09/21/18 0712)       Vital Signs Range (Last 24H):  Temp:  [96.3 °F (35.7 °C)-99.1 °F (37.3 °C)]   Pulse:  []   Resp:  [13-35]   BP: (103-162)/()   SpO2:  [94 %-100 %]     Weight: 119.1 kg (262 lb 9.1 oz)  Body mass index is 42.38 kg/m².    Intake/Output Summary (Last 24 hours) at 9/21/2018 0752  Last data filed at 9/21/2018 0600  Gross per 24 hour   Intake 1056 ml   Output 3425 ml   Net -2369 ml     Physical Examination:  Constitutional: She is oriented to person, place, and time. No distress.   HENT:   Head: Normocephalic and atraumatic.   Eyes: Conjunctivae are normal. Right eye exhibits no discharge. Left eye exhibits no discharge. No scleral icterus.   Neck: Normal range of motion. Neck supple. No JVD present. No thyromegaly  present.   Cardiovascular: Normal rate and regular rhythm. Exam reveals no gallop and no friction rub.   Murmur heard.  Pulmonary/Chest: Effort normal and breath sounds normal. No respiratory distress. She has no wheezes. She has no rales. She exhibits no tenderness.   Abdominal: Soft. Bowel sounds are normal. She exhibits no distension. There is no tenderness. There is no rebound and no guarding.   Musculoskeletal: She exhibits no edema or tenderness. 1+ pitting edema.  Lymphadenopathy:     She has no cervical adenopathy.   Neurological: She is alert and oriented to person, place, and time. No cranial nerve deficit.   Skin: Skin is dry. Capillary refill takes less than 2 seconds. She is not diaphoretic.   Pretibial bandage removed-small ulcer with a clean base noted.       CBC:  Recent Labs   Lab  09/17/18 0327 09/18/18   0305  09/19/18   0329   WBC  9.10  8.90  10.70   RBC  3.81*  4.00  4.19   HGB  8.3*  8.7*  9.4*   HCT  27.6*  28.8*  30.5*   PLT  269  315  282   MCV  72*  72*  73*   MCH  21.7*  21.7*  22.4*   MCHC  29.9*  30.0*  30.7*   BMP  Recent Labs   Lab  09/17/18   0327 09/18/18   0304  09/19/18   0329   GLU  161*  107  99   NA  136  139  139   K  4.0  3.6  3.5   CL  97  98  97   CO2  29  35*  34*   BUN  51*  44*  35*   CREATININE  1.4  1.2  0.9   CALCIUM  8.5*  8.7  8.9   MG  1.6  1.6  1.5*      Diagnostic Results:  Microbiology Results (last 7 days)     Procedure Component Value Units Date/Time    Blood culture x two cultures. Draw prior to antibiotics. [[id number]] Collected:  09/11/18 2151    Order Status:  Completed Specimen:  Blood Updated:  09/17/18 0612     Blood Culture, Routine No growth after 5 days.    Narrative:       Aerobic and anaerobic    Blood culture x two cultures. Draw prior to antibiotics. [[id number]] Collected:  09/11/18 2151    Order Status:  Completed Specimen:  Blood Updated:  09/17/18 0612     Blood Culture, Routine No growth after 5 days.    Narrative:       Aerobic and  anaerobic    Urine culture [[id number]]  (Susceptibility) Collected:  09/11/18 2200    Order Status:  Completed Specimen:  Urine Updated:  09/14/18 1323     Urine Culture, Routine --     ESCHERICHIA COLI  50,000 - 99,999 cfu/ml      Narrative:       Preferred Collection Type->Urine, Catheterized         CXR: Perihilar and basal pleural and parenchymal changes.  Asymmetric CHF and pneumonia could both yield this appearance.    CT urogram:  Small volume of ascites, small bilateral pleural effusions and posterior bibasilar atelectasis.  Extensive subcutaneous fat stranding and fluid.  Diverticulosis without CT findings of acute diverticulitis.  Renal scarring.  Additional findings as detailed above including IVC filter, osseous degenerative change.    CXR:   Appropriately positioned right PICC line.  Cardiomegaly.  Right basilar opacification representing consolidation and/or pleural effusion.    CXR: 1. Persistent congestive heart failure not significantly changed over the interval.  2. Persistent small bilateral pleural effusions with atelectasis versus infiltrate at the lung bases.  3. Right subclavian PICC line.    Assessment/Plan:     * Severe sepsis     Secondary to UTI.  Associated with lactic acidosis and leukocytosis-now resolved.   Continue Cefepime.  Remains with labile blood pressures although no evidence of worsening infection and antibitoics are culture appropriate.   Repeat UA.       Encephalopathy, metabolic     Acute  Secondary to UTI  Mild improvement after IV fluids  Continue to monitor closely        Stage 2 chronic kidney disease     Creatinine at baseline  Will trend daily       Hypoglycemia  Continue gentle IV dextrose to keep blood glucose up.      Glaucoma     Chronic, stable  Continue BB eye gtts       Acquired hypothyroidism     Chronic  Continue Synthroid       Chronic diastolic heart failure     No evidence of exacerbation   Total body volume overloaded however  Lasix 40 BID      Bradycardia    Atrial fibrillation with slow ventricular response.  Off BB and Digoxin. Follow cardiology recommendations.     Essential hypertension     Currently on low side of normal- as above  Hold anti-hypertensive's   Monitor and treat accordingly        Acute cystitis with hematuria with E. Coli sp     Empiric coverage with cefepime  Dr. Holman is following, intermittent bladder irrigation, outpatient cystoscopy planned.       History of pulmonary embolism     Continue Lovenox 1 mg/kg sq q day.     DVT prophylaxis: Use SCD and TEDs. On Lovenox.    Code status: DNR.    Requested family meeting to discuss future plan of care. Await family's arrival.    Erin Silver MD  Department of Hospital Medicine   [facility]    Addendum: 5:15pm  Family meeting with son from [place], [place] done who is agreement for hospice care at NH. Patient agreed as well. Since patient's brother passed away and POA daughter likely to visit in am and confirm final hospice decision in am.

## 2018-09-21 NOTE — PLAN OF CARE
09/21/18 0712   Patient Assessment/Suction   Level of Consciousness (AVPU) responds to voice   Respiratory Effort Unlabored   Expansion/Accessory Muscles/Retractions no retractions;no use of accessory muscles   All Lung Fields Breath Sounds diminished;clear   Rhythm/Pattern, Respiratory unlabored   Cough Frequency infrequent   Cough Type nonproductive   PRE-TX-O2-ETCO2   O2 Device (Oxygen Therapy) nasal cannula   $ Is the patient on Low Flow Oxygen? Yes   Flow (L/min) 3   Oxygen Concentration (%) 32   SpO2 99 %   Pulse Oximetry Type Continuous   $ Pulse Oximetry - Multiple Charge Pulse Oximetry - Multiple   Pulse 105   Resp (!) 22   Aerosol Therapy   $ Aerosol Therapy Charges Aerosol Treatment   Respiratory Treatment Status given   SVN/Inhaler Treatment Route mask   Position During Treatment HOB at 45 degrees   Patient Tolerance good   Post-Treatment   Post-treatment Heart Rate (beats/min) 107   Post-treatment Resp Rate (breaths/min) 18   All Fields Breath Sounds unchanged   Ready to Wean/Extubation Screen   FIO2<=50 (chart decimal) 0.32

## 2018-09-21 NOTE — PLAN OF CARE
09/21/18 1718   Discharge Assessment   Assessment Type Discharge Planning Reassessment   Dr. Silver spoke with patient and family and patient is agreeable to hospice but wants to wait until tomorrow for daughter, Clau Blackmon (ph#[phone])    who is POA to arrive from out of town tomorrow to make plans.

## 2018-09-21 NOTE — PROGRESS NOTES
Update on family meeting with Dr. Silver.  Gerardo Anton RN says that jose m Regan had called back to report that other family members will not be here till tomorrow evening for the meeting.

## 2018-09-22 LAB
ANION GAP SERPL CALC-SCNC: 6 MMOL/L
ANISOCYTOSIS BLD QL SMEAR: ABNORMAL
BASOPHILS NFR BLD: 0 %
BUN SERPL-MCNC: 20 MG/DL
CALCIUM SERPL-MCNC: 8.2 MG/DL
CHLORIDE SERPL-SCNC: 91 MMOL/L
CO2 SERPL-SCNC: 43 MMOL/L
CREAT SERPL-MCNC: 0.7 MG/DL
DIFFERENTIAL METHOD: ABNORMAL
EOSINOPHIL NFR BLD: 1 %
ERYTHROCYTE [DISTWIDTH] IN BLOOD BY AUTOMATED COUNT: 24.9 %
EST. GFR  (AFRICAN AMERICAN): >60 ML/MIN/1.73 M^2
EST. GFR  (NON AFRICAN AMERICAN): >60 ML/MIN/1.73 M^2
GLUCOSE SERPL-MCNC: 109 MG/DL
HCT VFR BLD AUTO: 29.2 %
HGB BLD-MCNC: 8.5 G/DL
HYPOCHROMIA BLD QL SMEAR: ABNORMAL
INR PPP: 1.5
LYMPHOCYTES NFR BLD: 14 %
MAGNESIUM SERPL-MCNC: 1.5 MG/DL
MCH RBC QN AUTO: 21.4 PG
MCHC RBC AUTO-ENTMCNC: 29 G/DL
MCV RBC AUTO: 74 FL
MONOCYTES NFR BLD: 3 %
NEUTROPHILS NFR BLD: 82 %
PHOSPHATE SERPL-MCNC: 2.8 MG/DL
PLATELET # BLD AUTO: 385 K/UL
PLATELET BLD QL SMEAR: ABNORMAL
PMV BLD AUTO: 8.9 FL
POIKILOCYTOSIS BLD QL SMEAR: SLIGHT
POLYCHROMASIA BLD QL SMEAR: ABNORMAL
POTASSIUM SERPL-SCNC: 3 MMOL/L
PROTHROMBIN TIME: 15.2 SEC
RBC # BLD AUTO: 3.95 M/UL
SODIUM SERPL-SCNC: 140 MMOL/L
WBC # BLD AUTO: 12.5 K/UL

## 2018-09-22 PROCEDURE — 63600175 PHARM REV CODE 636 W HCPCS: Performed by: INTERNAL MEDICINE

## 2018-09-22 PROCEDURE — 84100 ASSAY OF PHOSPHORUS: CPT

## 2018-09-22 PROCEDURE — 85007 BL SMEAR W/DIFF WBC COUNT: CPT

## 2018-09-22 PROCEDURE — 94640 AIRWAY INHALATION TREATMENT: CPT

## 2018-09-22 PROCEDURE — 20000000 HC ICU ROOM

## 2018-09-22 PROCEDURE — 27000221 HC OXYGEN, UP TO 24 HOURS

## 2018-09-22 PROCEDURE — 85027 COMPLETE CBC AUTOMATED: CPT

## 2018-09-22 PROCEDURE — 80048 BASIC METABOLIC PNL TOTAL CA: CPT

## 2018-09-22 PROCEDURE — 25000003 PHARM REV CODE 250: Performed by: INTERNAL MEDICINE

## 2018-09-22 PROCEDURE — 85610 PROTHROMBIN TIME: CPT

## 2018-09-22 PROCEDURE — 25000242 PHARM REV CODE 250 ALT 637 W/ HCPCS: Performed by: INTERNAL MEDICINE

## 2018-09-22 PROCEDURE — 25000003 PHARM REV CODE 250: Performed by: NURSE PRACTITIONER

## 2018-09-22 PROCEDURE — 94761 N-INVAS EAR/PLS OXIMETRY MLT: CPT

## 2018-09-22 PROCEDURE — 63600175 PHARM REV CODE 636 W HCPCS: Performed by: NURSE PRACTITIONER

## 2018-09-22 PROCEDURE — 83735 ASSAY OF MAGNESIUM: CPT

## 2018-09-22 PROCEDURE — 36415 COLL VENOUS BLD VENIPUNCTURE: CPT

## 2018-09-22 RX ORDER — MAGNESIUM SULFATE HEPTAHYDRATE 40 MG/ML
2 INJECTION, SOLUTION INTRAVENOUS ONCE
Status: COMPLETED | OUTPATIENT
Start: 2018-09-22 | End: 2018-09-22

## 2018-09-22 RX ORDER — POTASSIUM CHLORIDE 29.8 MG/ML
40 INJECTION INTRAVENOUS ONCE
Status: COMPLETED | OUTPATIENT
Start: 2018-09-22 | End: 2018-09-22

## 2018-09-22 RX ADMIN — POTASSIUM CHLORIDE 40 MEQ: 400 INJECTION, SOLUTION INTRAVENOUS at 04:09

## 2018-09-22 RX ADMIN — IPRATROPIUM BROMIDE AND ALBUTEROL SULFATE 3 ML: .5; 3 SOLUTION RESPIRATORY (INHALATION) at 07:09

## 2018-09-22 RX ADMIN — FUROSEMIDE 40 MG: 10 INJECTION, SOLUTION INTRAVENOUS at 10:09

## 2018-09-22 RX ADMIN — MAGNESIUM SULFATE HEPTAHYDRATE 2 G: 40 INJECTION, SOLUTION INTRAVENOUS at 04:09

## 2018-09-22 RX ADMIN — LEVOTHYROXINE SODIUM 25 MCG: 25 TABLET ORAL at 06:09

## 2018-09-22 RX ADMIN — IPRATROPIUM BROMIDE AND ALBUTEROL SULFATE 3 ML: .5; 3 SOLUTION RESPIRATORY (INHALATION) at 12:09

## 2018-09-22 RX ADMIN — TIMOLOL MALEATE 1 DROP: 5 SOLUTION OPHTHALMIC at 10:09

## 2018-09-22 RX ADMIN — CEFEPIME HYDROCHLORIDE 1 G: 1 INJECTION, POWDER, FOR SOLUTION INTRAMUSCULAR; INTRAVENOUS at 03:09

## 2018-09-22 RX ADMIN — LATANOPROST 1 DROP: 50 SOLUTION OPHTHALMIC at 09:09

## 2018-09-22 RX ADMIN — ENOXAPARIN SODIUM 130 MG: 150 INJECTION SUBCUTANEOUS at 06:09

## 2018-09-22 RX ADMIN — FUROSEMIDE 40 MG: 10 INJECTION, SOLUTION INTRAVENOUS at 06:09

## 2018-09-22 RX ADMIN — CEFEPIME HYDROCHLORIDE 1 G: 1 INJECTION, POWDER, FOR SOLUTION INTRAMUSCULAR; INTRAVENOUS at 02:09

## 2018-09-22 RX ADMIN — IPRATROPIUM BROMIDE AND ALBUTEROL SULFATE 3 ML: .5; 3 SOLUTION RESPIRATORY (INHALATION) at 01:09

## 2018-09-22 RX ADMIN — PANTOPRAZOLE SODIUM 40 MG: 40 TABLET, DELAYED RELEASE ORAL at 10:09

## 2018-09-22 RX ADMIN — PHYTONADIONE 10 MG: 10 INJECTION, EMULSION INTRAMUSCULAR; INTRAVENOUS; SUBCUTANEOUS at 02:09

## 2018-09-22 NOTE — PROGRESS NOTES
[facility] Hospital Medicine  Progress Note    Patient Name: Marjorie Macario  MRN: [id number]  Patient Class: IP- Inpatient   Admission Date: 9/11/2018  Length of Stay: 10 days  Attending Physician: Erin Silver MD  Primary Care Provider: Sotero Le MD        Subjective:     Principal Problem:Severe sepsis    HPI:  Pt was sent from [facility] for hematuria and altered mental status. Pt was difficult to arouse, now after IV fluids, awake and talking unintelligible. Unable to obtain history and ROS from pt due to encephalopathy. Records did not offer any up to date information. In July, pt was treated for septic shock due to UTI at [facility]. Pt required pressors along with mechanical ventilation at that time.     Hospital Course:  No notes on file    Interval History: Patient seen and examined with no acute events. HD stable.     Review of Systems   Constitutional: Negative for activity change, appetite change and fever.   HENT: Negative.    Eyes: Negative.    Respiratory: Negative for chest tightness, shortness of breath and wheezing.    Cardiovascular: Negative for chest pain, palpitations and leg swelling.   Gastrointestinal: Negative for abdominal distention, abdominal pain, blood in stool, diarrhea and vomiting.   Genitourinary: Negative for dysuria and hematuria.   Neurological: Negative for headaches.   Hematological: Negative for adenopathy.   Psychiatric/Behavioral: Positive for confusion.     Objective:     Vital Signs (Most Recent):  Temp: 97.3 °F (36.3 °C) (09/22/18 0800)  Pulse: (!) 114 (09/22/18 0800)  Resp: (!) 25 (09/22/18 0800)  BP: 127/63 (09/22/18 0800)  SpO2: 98 % (09/22/18 0716) Vital Signs (24h Range):  Temp:  [97.3 °F (36.3 °C)-98.6 °F (37 °C)] 97.3 °F (36.3 °C)  Pulse:  [] 114  Resp:  [18-54] 25  SpO2:  [90 %-100 %] 98 %  BP: ()/(41-75) 127/63     Weight: 119.1 kg (262 lb 9.1 oz)  Body mass index is 42.38 kg/m².    Intake/Output Summary (Last 24 hours) at  9/22/2018 0916  Last data filed at 9/22/2018 0600  Gross per 24 hour   Intake 872.5 ml   Output 3750 ml   Net -2877.5 ml      Physical Exam   Constitutional: She appears well-developed and well-nourished. No distress.   HENT:   Head: Normocephalic and atraumatic.   Eyes: Pupils are equal, round, and reactive to light.   Neck: Neck supple. No thyromegaly present.   Cardiovascular: Normal rate and regular rhythm. Exam reveals no gallop and no friction rub.   No murmur heard.  Pulmonary/Chest: Breath sounds normal. No respiratory distress. She has no wheezes.   Mildly labored   Abdominal: Soft. Bowel sounds are normal. She exhibits no distension. There is no tenderness. There is no guarding.   Musculoskeletal: Normal range of motion. She exhibits edema.   Neurological: She is alert.   Skin: Skin is warm and dry. No erythema.       Significant Labs:   CBC:   Recent Labs   Lab  09/21/18 1949 09/22/18   0305   WBC  12.80*  12.50   HGB  8.9*  8.5*   HCT  30.4*  29.2*   PLT  430*  385*       Significant Imaging: I have reviewed all pertinent imaging results/findings within the past 24 hours.    Assessment/Plan:      * Severe sepsis    Secondary to UTI  Associated with lactic acidosis and leukocytosis- now resolved  Heart rate blunted by daily BB and digoxin   Continue cefepime for now    Awaiting final decision for nursing home hospice which hopefully would be made today          Encephalopathy, metabolic    Acute  Secondary to UTI  Mild improvement after IV fluids  Continue to monitor closely           Stage 2 chronic kidney disease    Creatinine at baseline  Will trend daily           Glaucoma    Chronic, stable  Continue BB eye gtts          Acquired hypothyroidism    Chronic  Continue Synthroid           Chronic diastolic heart failure    On Lasix 40 BID          Essential hypertension    Currently on low side of normal   Hold anti-hypertensive's   Monitor and treat accordingly           Acute cystitis with hematuria     On cefepime          History of pulmonary embolism    On treatment dose Lovenox          VTE Risk Mitigation (From admission, onward)        Ordered     enoxaparin injection 130 mg  Every 12 hours (non-standard times)      09/18/18 1621     Place sequential compression device  Until discontinued      09/12/18 1622          Critical care time spent on the evaluation and treatment of severe organ dysfunction, review of pertinent labs and imaging studies, discussions with consulting providers and discussions with patient/family: 25 minutes.    Reinaldo Chaparro MD  Department of Hospital Medicine   [facility]

## 2018-09-22 NOTE — SUBJECTIVE & OBJECTIVE
Interval History: Patient seen and examined with no acute events. HD stable.     Review of Systems   Constitutional: Negative for activity change, appetite change and fever.   HENT: Negative.    Eyes: Negative.    Respiratory: Negative for chest tightness, shortness of breath and wheezing.    Cardiovascular: Negative for chest pain, palpitations and leg swelling.   Gastrointestinal: Negative for abdominal distention, abdominal pain, blood in stool, diarrhea and vomiting.   Genitourinary: Negative for dysuria and hematuria.   Neurological: Negative for headaches.   Hematological: Negative for adenopathy.   Psychiatric/Behavioral: Positive for confusion.     Objective:     Vital Signs (Most Recent):  Temp: 97.3 °F (36.3 °C) (09/22/18 0800)  Pulse: (!) 114 (09/22/18 0800)  Resp: (!) 25 (09/22/18 0800)  BP: 127/63 (09/22/18 0800)  SpO2: 98 % (09/22/18 0716) Vital Signs (24h Range):  Temp:  [97.3 °F (36.3 °C)-98.6 °F (37 °C)] 97.3 °F (36.3 °C)  Pulse:  [] 114  Resp:  [18-54] 25  SpO2:  [90 %-100 %] 98 %  BP: ()/(41-75) 127/63     Weight: 119.1 kg (262 lb 9.1 oz)  Body mass index is 42.38 kg/m².    Intake/Output Summary (Last 24 hours) at 9/22/2018 0916  Last data filed at 9/22/2018 0600  Gross per 24 hour   Intake 872.5 ml   Output 3750 ml   Net -2877.5 ml      Physical Exam   Constitutional: She appears well-developed and well-nourished. No distress.   HENT:   Head: Normocephalic and atraumatic.   Eyes: Pupils are equal, round, and reactive to light.   Neck: Neck supple. No thyromegaly present.   Cardiovascular: Normal rate and regular rhythm. Exam reveals no gallop and no friction rub.   No murmur heard.  Pulmonary/Chest: Breath sounds normal. No respiratory distress. She has no wheezes.   Mildly labored   Abdominal: Soft. Bowel sounds are normal. She exhibits no distension. There is no tenderness. There is no guarding.   Musculoskeletal: Normal range of motion. She exhibits edema.   Neurological: She is  alert.   Skin: Skin is warm and dry. No erythema.       Significant Labs:   CBC:   Recent Labs   Lab  09/21/18 1949 09/22/18   0305   WBC  12.80*  12.50   HGB  8.9*  8.5*   HCT  30.4*  29.2*   PLT  430*  385*       Significant Imaging: I have reviewed all pertinent imaging results/findings within the past 24 hours.

## 2018-09-22 NOTE — ASSESSMENT & PLAN NOTE
Secondary to UTI  Associated with lactic acidosis and leukocytosis- now resolved  Heart rate blunted by daily BB and digoxin   Continue cefepime for now    Awaiting final decision for nursing home hospice which hopefully would be made today

## 2018-09-22 NOTE — PLAN OF CARE
Problem: Patient Care Overview  Goal: Plan of Care Review  Shift goals discussed with patient with time given for questions and answers.    Comments: Denies pain this shift, safety maintained. Rests well after bath and cleaned up after having emesis X 1.

## 2018-09-22 NOTE — PLAN OF CARE
09/22/18 0716   Patient Assessment/Suction   Level of Consciousness (AVPU) alert   All Lung Fields Breath Sounds diminished   PRE-TX-O2-ETCO2   O2 Device (Oxygen Therapy) nasal cannula   $ Is the patient on Low Flow Oxygen? Yes   Flow (L/min) 3   Oxygen Concentration (%) 32   SpO2 98 %   Pulse Oximetry Type Continuous   $ Pulse Oximetry - Multiple Charge Pulse Oximetry - Multiple   Pulse 102   Resp 18   Aerosol Therapy   $ Aerosol Therapy Charges Aerosol Treatment   Respiratory Treatment Status given   SVN/Inhaler Treatment Route mask   Position During Treatment HOB at 45 degrees   Patient Tolerance good   Post-Treatment   Post-treatment Heart Rate (beats/min) 98   Post-treatment Resp Rate (breaths/min) 18   All Fields Breath Sounds unchanged   Ready to Wean/Extubation Screen   FIO2<=50 (chart decimal) 0.32

## 2018-09-22 NOTE — PLAN OF CARE
Problem: Patient Care Overview  Goal: Plan of Care Review  Outcome: Ongoing (interventions implemented as appropriate)  Results for CHINA DREW (MRN [id number]) as of 9/21/2018 19:27   Ref. Range 9/21/2018 18:58   POCT Glucose Latest Ref Range: 70 - 110 mg/dL 107       Note is made that this accucheck, as well as the earlier result of 123, were collected from patient's right EARLOBE, not finger tips.  All low accuchecks of earlier today collected on this shift were taken from fingertips.

## 2018-09-22 NOTE — PLAN OF CARE
Problem: Patient Care Overview  Goal: Plan of Care Review  Outcome: Ongoing (interventions implemented as appropriate)  Care plan reviewed.   Safety maintained.  Patient now has CM consult for Nursing home hospice.  IV antibiotics discontinued.  IV Vit.K given due to excessive bleeding from central line site. Bleeding is slowing down at this time.  Patient vomited this shift, appeared to be breakfast and lunch. Held dinner, giving small amounts of ice water.

## 2018-09-22 NOTE — PROGRESS NOTES
Central line dressing changed.  Applied surgi-seal and steri strips. Will monitor.  Dr. Chaparro is aware.

## 2018-09-22 NOTE — PLAN OF CARE
09/21/18 1905   Patient Assessment/Suction   Level of Consciousness (AVPU) alert   Respiratory Effort Normal;Unlabored   Expansion/Accessory Muscles/Retractions expansion symmetric;no retractions;no use of accessory muscles   All Lung Fields Breath Sounds Anterior:;equal bilaterally;coarse;diminished   Rhythm/Pattern, Respiratory depth regular;pattern regular;unlabored   Cough Frequency infrequent   Cough Type nonproductive   PRE-TX-O2-ETCO2   O2 Device (Oxygen Therapy) nasal cannula   $ Is the patient on Low Flow Oxygen? Yes   Flow (L/min) 3   SpO2 98 %   Pulse Oximetry Type Continuous   $ Pulse Oximetry - Multiple Charge Pulse Oximetry - Multiple   Pulse 80   Resp 20   Aerosol Therapy   $ Aerosol Therapy Charges Aerosol Treatment   Respiratory Treatment Status given   SVN/Inhaler Treatment Route mask   Position During Treatment HOB at 45 degrees   Patient Tolerance good   Post-Treatment   Post-treatment Heart Rate (beats/min) 85   Post-treatment Resp Rate (breaths/min) 19

## 2018-09-22 NOTE — PLAN OF CARE
Spoke to patients JOSE Regan who confirmed decision for [facility] hospice.  Will consult social work for this. Lovenox and antibiotics held.

## 2018-09-23 LAB
ANION GAP SERPL CALC-SCNC: 7 MMOL/L
ANISOCYTOSIS BLD QL SMEAR: ABNORMAL
BASOPHILS NFR BLD: 0 %
BUN SERPL-MCNC: 17 MG/DL
CALCIUM SERPL-MCNC: 8.5 MG/DL
CHLORIDE SERPL-SCNC: 90 MMOL/L
CO2 SERPL-SCNC: 43 MMOL/L
CREAT SERPL-MCNC: 0.6 MG/DL
DIFFERENTIAL METHOD: ABNORMAL
EOSINOPHIL NFR BLD: 0 %
ERYTHROCYTE [DISTWIDTH] IN BLOOD BY AUTOMATED COUNT: 25.6 %
EST. GFR  (AFRICAN AMERICAN): >60 ML/MIN/1.73 M^2
EST. GFR  (NON AFRICAN AMERICAN): >60 ML/MIN/1.73 M^2
GLUCOSE SERPL-MCNC: 101 MG/DL
HCT VFR BLD AUTO: 29.9 %
HGB BLD-MCNC: 8.7 G/DL
HYPOCHROMIA BLD QL SMEAR: ABNORMAL
INR PPP: 1.4
LYMPHOCYTES NFR BLD: 9 %
MAGNESIUM SERPL-MCNC: 1.7 MG/DL
MCH RBC QN AUTO: 21.4 PG
MCHC RBC AUTO-ENTMCNC: 29 G/DL
MCV RBC AUTO: 74 FL
MONOCYTES NFR BLD: 4 %
NEUTROPHILS NFR BLD: 87 %
OVALOCYTES BLD QL SMEAR: ABNORMAL
PHOSPHATE SERPL-MCNC: 1.9 MG/DL
PLATELET # BLD AUTO: 425 K/UL
PLATELET BLD QL SMEAR: ABNORMAL
PMV BLD AUTO: 9.3 FL
POIKILOCYTOSIS BLD QL SMEAR: SLIGHT
POLYCHROMASIA BLD QL SMEAR: ABNORMAL
POTASSIUM SERPL-SCNC: 3.3 MMOL/L
PROTHROMBIN TIME: 13.7 SEC
RBC # BLD AUTO: 4.05 M/UL
SODIUM SERPL-SCNC: 140 MMOL/L
TARGETS BLD QL SMEAR: ABNORMAL
WBC # BLD AUTO: 15 K/UL

## 2018-09-23 PROCEDURE — 85610 PROTHROMBIN TIME: CPT

## 2018-09-23 PROCEDURE — 25000242 PHARM REV CODE 250 ALT 637 W/ HCPCS: Performed by: INTERNAL MEDICINE

## 2018-09-23 PROCEDURE — 25000003 PHARM REV CODE 250: Performed by: NURSE PRACTITIONER

## 2018-09-23 PROCEDURE — 36415 COLL VENOUS BLD VENIPUNCTURE: CPT

## 2018-09-23 PROCEDURE — 85027 COMPLETE CBC AUTOMATED: CPT

## 2018-09-23 PROCEDURE — 25000003 PHARM REV CODE 250

## 2018-09-23 PROCEDURE — 80048 BASIC METABOLIC PNL TOTAL CA: CPT

## 2018-09-23 PROCEDURE — 63600175 PHARM REV CODE 636 W HCPCS: Performed by: INTERNAL MEDICINE

## 2018-09-23 PROCEDURE — 25000003 PHARM REV CODE 250: Performed by: INTERNAL MEDICINE

## 2018-09-23 PROCEDURE — 27000221 HC OXYGEN, UP TO 24 HOURS

## 2018-09-23 PROCEDURE — 85007 BL SMEAR W/DIFF WBC COUNT: CPT

## 2018-09-23 PROCEDURE — 20000000 HC ICU ROOM

## 2018-09-23 PROCEDURE — 84100 ASSAY OF PHOSPHORUS: CPT

## 2018-09-23 PROCEDURE — 94640 AIRWAY INHALATION TREATMENT: CPT

## 2018-09-23 PROCEDURE — 83735 ASSAY OF MAGNESIUM: CPT

## 2018-09-23 PROCEDURE — 94761 N-INVAS EAR/PLS OXIMETRY MLT: CPT

## 2018-09-23 RX ORDER — LIDOCAINE HYDROCHLORIDE 10 MG/ML
INJECTION, SOLUTION EPIDURAL; INFILTRATION; INTRACAUDAL; PERINEURAL
Status: COMPLETED
Start: 2018-09-23 | End: 2018-09-23

## 2018-09-23 RX ADMIN — ACETAMINOPHEN 650 MG: 325 TABLET, FILM COATED ORAL at 04:09

## 2018-09-23 RX ADMIN — FUROSEMIDE 40 MG: 10 INJECTION, SOLUTION INTRAVENOUS at 10:09

## 2018-09-23 RX ADMIN — LIDOCAINE HYDROCHLORIDE: 10 INJECTION, SOLUTION EPIDURAL; INFILTRATION; INTRACAUDAL; PERINEURAL at 01:09

## 2018-09-23 RX ADMIN — IPRATROPIUM BROMIDE AND ALBUTEROL SULFATE 3 ML: .5; 3 SOLUTION RESPIRATORY (INHALATION) at 07:09

## 2018-09-23 RX ADMIN — ENOXAPARIN SODIUM 130 MG: 150 INJECTION SUBCUTANEOUS at 05:09

## 2018-09-23 RX ADMIN — TRAMADOL HYDROCHLORIDE 50 MG: 50 TABLET, FILM COATED ORAL at 01:09

## 2018-09-23 RX ADMIN — PANTOPRAZOLE SODIUM 40 MG: 40 TABLET, DELAYED RELEASE ORAL at 10:09

## 2018-09-23 RX ADMIN — IPRATROPIUM BROMIDE AND ALBUTEROL SULFATE 3 ML: .5; 3 SOLUTION RESPIRATORY (INHALATION) at 12:09

## 2018-09-23 RX ADMIN — POTASSIUM PHOSPHATE, MONOBASIC AND POTASSIUM PHOSPHATE, DIBASIC 30 MMOL: 224; 236 INJECTION, SOLUTION INTRAVENOUS at 05:09

## 2018-09-23 RX ADMIN — TRAMADOL HYDROCHLORIDE 50 MG: 50 TABLET, FILM COATED ORAL at 08:09

## 2018-09-23 RX ADMIN — IPRATROPIUM BROMIDE AND ALBUTEROL SULFATE 3 ML: .5; 3 SOLUTION RESPIRATORY (INHALATION) at 01:09

## 2018-09-23 RX ADMIN — LATANOPROST 1 DROP: 50 SOLUTION OPHTHALMIC at 08:09

## 2018-09-23 RX ADMIN — FUROSEMIDE 40 MG: 10 INJECTION, SOLUTION INTRAVENOUS at 05:09

## 2018-09-23 RX ADMIN — TIMOLOL MALEATE 1 DROP: 5 SOLUTION OPHTHALMIC at 10:09

## 2018-09-23 RX ADMIN — LEVOTHYROXINE SODIUM 25 MCG: 25 TABLET ORAL at 05:09

## 2018-09-23 NOTE — PLAN OF CARE
Problem: Patient Care Overview  Goal: Plan of Care Review  Simplified shift goals discussed with patient with time given for questions and answers.    Comments: Safety maintained, large emesis early in shift, NPO for remainder of shift. Medicated for pain once, wrist and arm pain noted. No other needs noted

## 2018-09-23 NOTE — PLAN OF CARE
Problem: Nutrition, Imbalanced: Inadequate Oral Intake (Adult)  Goal: Improved Oral Intake  Patient will demonstrate the desired outcomes by discharge/transition of care.  Outcome: Ongoing (interventions implemented as appropriate)  Tolerating clear liquids

## 2018-09-23 NOTE — PLAN OF CARE
Problem: Patient Care Overview  Goal: Plan of Care Review  Outcome: Ongoing (interventions implemented as appropriate)  Pt has been alert and oriented today.  Bleeding to TLC has stopped.  Family is still deciding which hospice company to go with per case management notes.  Pt has tolerated clear liquids so far, will consider advancing diet tomorrow.

## 2018-09-23 NOTE — PLAN OF CARE
09/22/18 1904   Patient Assessment/Suction   Level of Consciousness (AVPU) alert   Respiratory Effort Unlabored   All Lung Fields Breath Sounds clear;diminished   RUL Breath Sounds (slight crackles heard)   PRE-TX-O2-ETCO2   O2 Device (Oxygen Therapy) nasal cannula w/ humidification   $ Is the patient on Low Flow Oxygen? Yes   Flow (L/min) 3   SpO2 100 %   Pulse Oximetry Type Continuous   $ Pulse Oximetry - Multiple Charge Pulse Oximetry - Multiple   Pulse (!) 123   Resp 20   Aerosol Therapy   $ Aerosol Therapy Charges Aerosol Treatment   Respiratory Treatment Status given   SVN/Inhaler Treatment Route with oxygen;mask   Position During Treatment HOB at 45 degrees   Patient Tolerance good   Post-Treatment   Post-treatment Heart Rate (beats/min) 116   Post-treatment Resp Rate (breaths/min) 19   All Fields Breath Sounds unchanged

## 2018-09-23 NOTE — PLAN OF CARE
Per Hospice Consult, ANDREA attempted to contact pt's daughter, Shereen Blackmon @ [phone] .  No answer, left message for her to call ANDREA @ [phone].       09/23/18 1151   Discharge Reassessment   Assessment Type Discharge Planning Reassessment

## 2018-09-23 NOTE — NURSING
Dr Jordan assessed central line, another suture added to site, done in sterile field.  Seems to have stopped bleeding, will continue to monitor.

## 2018-09-23 NOTE — NURSING
surgi foam applied to central line site.  Sterile technique used and dressing changed.  Moderate amount of blood drainging from site entrance.  Will continue to monitor.  Pt labs are normal.

## 2018-09-23 NOTE — PLAN OF CARE
09/23/18 0710   Patient Assessment/Suction   Level of Consciousness (AVPU) alert   All Lung Fields Breath Sounds diminished   SALOMON Breath Sounds diminished   PRE-TX-O2-ETCO2   O2 Device (Oxygen Therapy) nasal cannula   $ Is the patient on Low Flow Oxygen? Yes   Flow (L/min) 3   Oxygen Concentration (%) 32   SpO2 100 %   Pulse Oximetry Type Continuous   $ Pulse Oximetry - Multiple Charge Pulse Oximetry - Multiple   Pulse (!) 120   Resp 16   Aerosol Therapy   $ Aerosol Therapy Charges Aerosol Treatment   Respiratory Treatment Status given   SVN/Inhaler Treatment Route mask   Position During Treatment HOB at 45 degrees   Patient Tolerance good   Post-Treatment   Post-treatment Heart Rate (beats/min) 124   Post-treatment Resp Rate (breaths/min) 16   All Fields Breath Sounds unchanged   Ready to Wean/Extubation Screen   FIO2<=50 (chart decimal) 0.32        09/23/18 0710   Patient Assessment/Suction   Level of Consciousness (AVPU) alert   All Lung Fields Breath Sounds diminished   SALOMON Breath Sounds diminished   PRE-TX-O2-ETCO2   O2 Device (Oxygen Therapy) nasal cannula   $ Is the patient on Low Flow Oxygen? Yes   Flow (L/min) 3   Oxygen Concentration (%) 32   SpO2 100 %   Pulse Oximetry Type Continuous   $ Pulse Oximetry - Multiple Charge Pulse Oximetry - Multiple   Pulse (!) 120   Resp 16   Aerosol Therapy   $ Aerosol Therapy Charges Aerosol Treatment   Respiratory Treatment Status given   SVN/Inhaler Treatment Route mask   Position During Treatment HOB at 45 degrees   Patient Tolerance good   Post-Treatment   Post-treatment Heart Rate (beats/min) 124   Post-treatment Resp Rate (breaths/min) 16   All Fields Breath Sounds unchanged   Ready to Wean/Extubation Screen   FIO2<=50 (chart decimal) 0.32

## 2018-09-24 VITALS
TEMPERATURE: 99 F | OXYGEN SATURATION: 100 % | RESPIRATION RATE: 20 BRPM | HEART RATE: 92 BPM | WEIGHT: 246.94 LBS | HEIGHT: 66 IN | SYSTOLIC BLOOD PRESSURE: 119 MMHG | BODY MASS INDEX: 39.69 KG/M2 | DIASTOLIC BLOOD PRESSURE: 86 MMHG

## 2018-09-24 LAB
ANION GAP SERPL CALC-SCNC: 6 MMOL/L
ANISOCYTOSIS BLD QL SMEAR: ABNORMAL
BASOPHILS # BLD AUTO: 0.1 K/UL
BASOPHILS NFR BLD: 0.4 %
BUN SERPL-MCNC: 19 MG/DL
CALCIUM SERPL-MCNC: 8.4 MG/DL
CHLORIDE SERPL-SCNC: 90 MMOL/L
CO2 SERPL-SCNC: 43 MMOL/L
CREAT SERPL-MCNC: 0.7 MG/DL
DIFFERENTIAL METHOD: ABNORMAL
EOSINOPHIL # BLD AUTO: 0.1 K/UL
EOSINOPHIL NFR BLD: 0.9 %
ERYTHROCYTE [DISTWIDTH] IN BLOOD BY AUTOMATED COUNT: 24.9 %
EST. GFR  (AFRICAN AMERICAN): >60 ML/MIN/1.73 M^2
EST. GFR  (NON AFRICAN AMERICAN): >60 ML/MIN/1.73 M^2
GLUCOSE SERPL-MCNC: 100 MG/DL
HCT VFR BLD AUTO: 28.1 %
HGB BLD-MCNC: 8.1 G/DL
HYPOCHROMIA BLD QL SMEAR: ABNORMAL
INR PPP: 1.5
LYMPHOCYTES # BLD AUTO: 1.7 K/UL
LYMPHOCYTES NFR BLD: 10.2 %
MAGNESIUM SERPL-MCNC: 1.7 MG/DL
MCH RBC QN AUTO: 21.3 PG
MCHC RBC AUTO-ENTMCNC: 28.6 G/DL
MCV RBC AUTO: 74 FL
MONOCYTES # BLD AUTO: 1.9 K/UL
MONOCYTES NFR BLD: 11.4 %
NEUTROPHILS # BLD AUTO: 12.6 K/UL
NEUTROPHILS NFR BLD: 77.1 %
PHOSPHATE SERPL-MCNC: 2.8 MG/DL
PLATELET # BLD AUTO: 404 K/UL
PLATELET BLD QL SMEAR: ABNORMAL
PMV BLD AUTO: 9 FL
POIKILOCYTOSIS BLD QL SMEAR: SLIGHT
POTASSIUM SERPL-SCNC: 3.5 MMOL/L
PROTHROMBIN TIME: 15.2 SEC
RBC # BLD AUTO: 3.79 M/UL
SODIUM SERPL-SCNC: 139 MMOL/L
WBC # BLD AUTO: 16.3 K/UL

## 2018-09-24 PROCEDURE — 97802 MEDICAL NUTRITION INDIV IN: CPT

## 2018-09-24 PROCEDURE — 63600175 PHARM REV CODE 636 W HCPCS: Performed by: INTERNAL MEDICINE

## 2018-09-24 PROCEDURE — 85610 PROTHROMBIN TIME: CPT

## 2018-09-24 PROCEDURE — 84100 ASSAY OF PHOSPHORUS: CPT

## 2018-09-24 PROCEDURE — 25000003 PHARM REV CODE 250: Performed by: INTERNAL MEDICINE

## 2018-09-24 PROCEDURE — G0008 ADMIN INFLUENZA VIRUS VAC: HCPCS | Performed by: INTERNAL MEDICINE

## 2018-09-24 PROCEDURE — 25000242 PHARM REV CODE 250 ALT 637 W/ HCPCS: Performed by: INTERNAL MEDICINE

## 2018-09-24 PROCEDURE — 80048 BASIC METABOLIC PNL TOTAL CA: CPT

## 2018-09-24 PROCEDURE — 94761 N-INVAS EAR/PLS OXIMETRY MLT: CPT

## 2018-09-24 PROCEDURE — 25000003 PHARM REV CODE 250: Performed by: NURSE PRACTITIONER

## 2018-09-24 PROCEDURE — 27000221 HC OXYGEN, UP TO 24 HOURS

## 2018-09-24 PROCEDURE — 90662 IIV NO PRSV INCREASED AG IM: CPT | Performed by: INTERNAL MEDICINE

## 2018-09-24 PROCEDURE — 83735 ASSAY OF MAGNESIUM: CPT

## 2018-09-24 PROCEDURE — 36415 COLL VENOUS BLD VENIPUNCTURE: CPT

## 2018-09-24 PROCEDURE — 90471 IMMUNIZATION ADMIN: CPT | Performed by: INTERNAL MEDICINE

## 2018-09-24 PROCEDURE — 99239 HOSP IP/OBS DSCHRG MGMT >30: CPT | Mod: ,,, | Performed by: INTERNAL MEDICINE

## 2018-09-24 PROCEDURE — 85025 COMPLETE CBC W/AUTO DIFF WBC: CPT

## 2018-09-24 PROCEDURE — 94640 AIRWAY INHALATION TREATMENT: CPT

## 2018-09-24 RX ORDER — PANTOPRAZOLE SODIUM 40 MG/1
40 TABLET, DELAYED RELEASE ORAL DAILY
Qty: 30 TABLET | Refills: 11 | Status: SHIPPED | OUTPATIENT
Start: 2018-09-25 | End: 2019-09-25

## 2018-09-24 RX ORDER — ENOXAPARIN SODIUM 100 MG/ML
100 INJECTION SUBCUTANEOUS
Status: DISCONTINUED | OUTPATIENT
Start: 2018-09-24 | End: 2018-09-24 | Stop reason: HOSPADM

## 2018-09-24 RX ORDER — FUROSEMIDE 20 MG/1
80 TABLET ORAL 2 TIMES DAILY
Start: 2018-09-24

## 2018-09-24 RX ADMIN — IPRATROPIUM BROMIDE AND ALBUTEROL SULFATE 3 ML: .5; 3 SOLUTION RESPIRATORY (INHALATION) at 12:09

## 2018-09-24 RX ADMIN — INFLUENZA A VIRUS A/MICHIGAN/45/2015 X-275 (H1N1) ANTIGEN (FORMALDEHYDE INACTIVATED), INFLUENZA A VIRUS A/SINGAPORE/INFIMH-16-0019/2016 IVR-186 (H3N2) ANTIGEN (FORMALDEHYDE INACTIVATED), AND INFLUENZA B VIRUS B/MARYLAND/15/2016 BX-69A (A B/COLORADO/6/2017-LIKE VIRUS) ANTIGEN (FORMALDEHYDE INACTIVATED) 0.5 ML: 60; 60; 60 INJECTION, SUSPENSION INTRAMUSCULAR at 03:09

## 2018-09-24 RX ADMIN — IPRATROPIUM BROMIDE AND ALBUTEROL SULFATE 3 ML: .5; 3 SOLUTION RESPIRATORY (INHALATION) at 06:09

## 2018-09-24 RX ADMIN — ENOXAPARIN SODIUM 130 MG: 150 INJECTION SUBCUTANEOUS at 06:09

## 2018-09-24 RX ADMIN — FUROSEMIDE 40 MG: 10 INJECTION, SOLUTION INTRAVENOUS at 09:09

## 2018-09-24 RX ADMIN — PANTOPRAZOLE SODIUM 40 MG: 40 TABLET, DELAYED RELEASE ORAL at 09:09

## 2018-09-24 RX ADMIN — LEVOTHYROXINE SODIUM 25 MCG: 25 TABLET ORAL at 06:09

## 2018-09-24 RX ADMIN — TIMOLOL MALEATE 1 DROP: 5 SOLUTION OPHTHALMIC at 09:09

## 2018-09-24 NOTE — PLAN OF CARE
I spoke to Corazon at [phone] and she stated that she spoke to [facility] and was informed that the pts daughter had to go to work and stated that her mom could complete the admit paperwork herself. I updated her that I was sending her discharge orders now. I sent discharge orders and AVS and [facility] and [facility] via [employer]. Booked the pts discharge destination via [employer]. . Susanne Monge LMSW       3:00- Per Madina with [facility] hospice [phone] they do have hospice consents signed and she is going to call Corazon at [facility] and give her an update also. Susanne Monge LMSW    3:23 pm- I left a message for Corazon at [phone] to confirm that consents have been signed and to see if they are ready for the nurse to call report. Per the pts nurse Lin, the pt is not able to safely sit in a wheelchair and will need ambulance transport back to [facility]. Susanne Monge LMSW     3:42- Per Corazon [phone]- report can be called to [facility] at [address]. Carmen with [facility] is sending an ambulance auth. Auth # [id number]. Completed ambulance transport form placed in the pts blue folder.  Susanne Monge LMSW         09/24/18 1421   Discharge Assessment   Assessment Type Discharge Planning Reassessment

## 2018-09-24 NOTE — PLAN OF CARE
I sent a 3 day packet, current meds and hospice consult [facility] [place] [facility] along with a note to contact the pts daughter Shereen at [phone] and added it to the pts AVS. Susanne Monge, FREDERICK      09/24/18 6818   Discharge Assessment   Assessment Type Discharge Planning Reassessment

## 2018-09-24 NOTE — PROGRESS NOTES
[facility] called for transport.  Report called to Queta at [facility].  Patient to be discharged to [facility] on Hospice.  Patient has received flu shot.

## 2018-09-24 NOTE — PLAN OF CARE
Problem: Patient Care Overview  Goal: Plan of Care Review  Outcome: Ongoing (interventions implemented as appropriate)  Resp even and unlabored with O2 at 3  Liters/nasal cannula. Tele: Af[facility] 's. Medicated x 1 for pain and rested well. No further bleeding from TLC site.Arms and legs elevated with pillows. Calm and cooperative with care. Confused to place, time and events. No fall/injury this shift. Tolerating clear liquids without distress. Minimal response to lasix, remains edematous.

## 2018-09-24 NOTE — PROGRESS NOTES
RD follow up deferred 2/2 pt has been accepted for hospice care.  Discharge planned today per chart review.

## 2018-09-24 NOTE — PLAN OF CARE
Spoke with Sehreen (daughter) [phone], regarding the pt [facility] [place] [facility]. She is in agreement and understands we anticipate the pt discharging today.   I will notify BERNARDO Skinner of the above and ask that [facility] contact Shereen to make arrangements to sign consents....Harini Kessler       09/24/18 0849   Discharge Reassessment   Assessment Type Discharge Planning Reassessment

## 2018-09-24 NOTE — PROGRESS NOTES
09/23/18 1914   Patient Assessment/Suction   Level of Consciousness (AVPU) alert   Respiratory Effort Normal;Unlabored   All Lung Fields Breath Sounds coarse   PRE-TX-O2-ETCO2   O2 Device (Oxygen Therapy) nasal cannula w/ humidification   Flow (L/min) 3   Oxygen Concentration (%) 32   SpO2 (!) 94 %   Pulse Oximetry Type Continuous   Pulse (!) 118   Resp 16   Aerosol Therapy   $ Aerosol Therapy Charges Aerosol Treatment   Respiratory Treatment Status given   SVN/Inhaler Treatment Route mask   Position During Treatment HOB at 45 degrees   Patient Tolerance good   Post-Treatment   Post-treatment Heart Rate (beats/min) 117   Post-treatment Resp Rate (breaths/min) 14   All Fields Breath Sounds unchanged   Ready to Wean/Extubation Screen   FIO2<=50 (chart decimal) 0.32

## 2018-09-24 NOTE — PLAN OF CARE
Problem: Patient Care Overview  Goal: Plan of Care Review  Outcome: Revised  Care plan revised.  Patient to be discharged back to [facility] with hospice care.  Has been afebrile this shift.  Oral intake is good, no nausea or vomiting.   Central line removed, bleeding controlled after approximately 40 minutes.

## 2018-09-24 NOTE — PROGRESS NOTES
09/24/18 0649   Patient Assessment/Suction   Level of Consciousness (AVPU) alert   All Lung Fields Breath Sounds clear;diminished   Cough Type none   PRE-TX-O2-ETCO2   O2 Device (Oxygen Therapy) nasal cannula   $ Is the patient on Low Flow Oxygen? Yes   Flow (L/min) 3   SpO2 100 %   Pulse Oximetry Type Continuous   $ Pulse Oximetry - Multiple Charge Pulse Oximetry - Multiple   Pulse 97   Resp 12   Aerosol Therapy   $ Aerosol Therapy Charges Aerosol Treatment   Respiratory Treatment Status given   SVN/Inhaler Treatment Route mask   Position During Treatment HOB at 30 degrees   Patient Tolerance good   Post-Treatment   Post-treatment Heart Rate (beats/min) 97   Post-treatment Resp Rate (breaths/min) 12   All Fields Breath Sounds unchanged

## 2018-09-24 NOTE — PROGRESS NOTES
Right IJ central line discontinued. Large amount of blood noted to site. Pressure held for 20minutes with no stabilization. Surgicel applied and pressure maintained to site. Dr. Mcdaniel anesthesia evaluated site. No suture required. Tegaderm applied and bleeding stopped. No hematoma noted. VSS. NAD noted. EMS was called back for transport to [facility].

## 2018-09-24 NOTE — PROGRESS NOTES
Per Corazon at [facility]:   · 9/24/2018 9:11:43 AM Note: Good Morning, Reaching out to Shereen now for financial updates. Will pull info after morning meeting. Thanks.  [email or web address] [email or web address]  · 9/24/2018 8:58:19 AM New: The pt is returning today and will needs hospice consents signed with [facility]. Pleaes call the pts daughter Shereen at [phone]. Thank you!  Susanne Monge    The pt is accepted at [facility]. Per Sangeetha [phone] Farrar the  is coming to see the pt around 12:45 pm. Susanne Monge, [facility]    · 9/24/2018 9:26:01 AM Accepted: received referral, thanks so much Susanne!  Madina [email or web address]  · 9/24/2018 8:57:14 AM Note: Daughter is Kelly [phone]  Susanne Monge  · 9/24/2018 8:56:33 AM New: The pt is returning to [facility] today and needs hospice consents signed with the pts daughter. Thank you!  Susanne Monge

## 2018-09-24 NOTE — PROGRESS NOTES
1620: Central line removed.   1625: Pressure was held 5 minutes, more pressure time needed due to excessive bleeding from site.  1630: EMS arrived for transport of patient.  1640: Surgiseal obtained from pharmacy, applied to patient central line site, Gerardo FANG holding pressure at this time. Informed EMS that patient will not leave yet and will call them back when bleeding stops.   1645: Called anesthesia to come evaluate.   1650: Anesthesia holding pressure at this moment.  1653: Gerardo FANG holding pressure.  1700: Bleeding slowed down, dressing applied, will monitor.   1705: No bleeding noted through the dressing. Will continue to monitor.   1725: No bleeding noted. Called EMS. Patient eating dinner, family is at bedside. All belongings gathered and placed in belongings bags. Daughter Shereen took patients rings home with her.

## 2018-09-24 NOTE — CLINICAL REVIEW
The enoxaparin dose has been adjusted for Marjorie Macario [id number] according to the pharmacy practice protocol.      Based on the patient's Estimated Creatinine Clearance: 82.7 mL/min (based on SCr of 0.7 mg/dL)., Body mass index is 39.85 kg/m²., and weight= 112 kg (246 lb 14.6 oz), and Prothrombin time = 15.2,  the enoxaparin dose has been adjusted 100 mg every 12 hours.    Iain Ramirez, PharmD.  09/24/2018

## 2018-09-24 NOTE — DISCHARGE SUMMARY
Discharge Summary  Hospital Medicine    Admit Date: 9/11/2018    Date and Time: 9/24/20182:19 PM    Discharge Attending Physician: Erin Silver MD    Primary Care Physician: Sotero Le MD    Diagnoses:  Active Hospital Problems    Diagnosis  POA    *Severe sepsis [A41.9, R65.20]  Yes    Severe malnutrition [E43]  Yes    History of pulmonary embolism [Z86.711]  Yes    Acute cystitis with hematuria [N30.01] with E. Coli sp  Yes    Essential hypertension [I10]  Yes    Chronic diastolic heart failure [I50.32]  Yes    Acquired hypothyroidism [E03.9]  Yes    Glaucoma [H40.9]  Yes    Stage 2 chronic kidney disease [N18.2]  Yes    Encephalopathy, metabolic [G93.41]  Yes        Discharged Condition: Fair    Hospital Course:   Pt was sent from NH for hematuria and altered mental status. Pt was difficult to arouse, now after IV fluids, awake and talking unintelligible. In July, pt was treated for septic shock due to UTI at [facility]. Pt required pressors along with mechanical ventilation at that time. Patient was admitted to Hospitalist medicine service. Patient was managed in ICU. Patient treated for sepsis and UTI. Patient subsequently noted to have bradycardiac which required assistance from cardiologist and use of Dobutrex. Patient did not have much PO intake and treated for hypoglycemia. Due to patient's worsening overall condition and poor quality of life, patient's family requested hospice arrangements at NH which was done by  and case management. Patient was also evaluated by Dr. Holman, hematuria resolved, outpatient cystoscopy suggested by Dr. Holman. Patient was discharged to NH hospice in stable condition with following discharge plan of care. Total time with the patient was 30 minutes and greater than 50% was spent in counseling and coordination of care. The assessment and plan have been discussed at length. Physicians' notes reviewed. Labs and procedure reviewed.     Consults:  Dr. Lunsford, Dr. CINDA Felton. Dr. Holman    Significant Diagnostic Studies:   CXR: Perihilar and basal pleural and parenchymal changes.  Asymmetric CHF and pneumonia could both yield this appearance.     CT urogram:  Small volume of ascites, small bilateral pleural effusions and posterior bibasilar atelectasis.  Extensive subcutaneous fat stranding and fluid.  Diverticulosis without CT findings of acute diverticulitis.  Renal scarring.  Additional findings as detailed above including IVC filter, osseous degenerative change.     CXR:   Appropriately positioned right PICC line.  Cardiomegaly.  Right basilar opacification representing consolidation and/or pleural effusion.     CXR: 1. Persistent congestive heart failure not significantly changed over the interval.  2. Persistent small bilateral pleural effusions with atelectasis versus infiltrate at the lung bases.  3. Right subclavian PICC line.    Microbiology Results (last 7 days)     ** No results found for the last 168 hours. **        Special Treatments/Procedures: None  Disposition: NH Hospice    Medications:  Reconciled Home Medications:   Current Discharge Medication List      START taking these medications    Details   pantoprazole (PROTONIX) 40 MG tablet Take 1 tablet (40 mg total) by mouth once daily.  Qty: 30 tablet, Refills: 11         CONTINUE these medications which have CHANGED    Details   furosemide (LASIX) 20 MG tablet Take 4 tablets (80 mg total) by mouth 2 (two) times daily.         CONTINUE these medications which have NOT CHANGED    Details   acetaminophen (TYLENOL) 325 MG tablet Take 650 mg by mouth every 6 (six) hours as needed for Pain.      albuterol-ipratropium (DUO-NEB) 2.5 mg-0.5 mg/3 mL nebulizer solution Take 3 mLs by nebulization every 6 (six) hours as needed for Wheezing or Shortness of Breath. Rescue      apixaban 5 mg Tab Take 5 mg by mouth 2 (two) times daily.      aspirin (ECOTRIN) 81 MG EC tablet Take 81 mg by mouth once  daily.      latanoprost 0.005 % ophthalmic solution Place 2 drops into both eyes 2 (two) times daily.      levothyroxine (SYNTHROID) 25 MCG tablet Take 25 mcg by mouth before breakfast.      nut.tx.imp.renal fxn,lac-free (PRO-STAT RC ORAL) Take 45 mLs by mouth 2 (two) times daily.      timolol maleate 0.5% (TIMOPTIC) 0.5 % Drop Place 2 drops into both eyes 2 (two) times daily.       traMADol (ULTRAM) 50 mg tablet Take 50 mg by mouth every 6 (six) hours as needed for Pain.         STOP taking these medications       digoxin (LANOXIN) 250 mcg tablet Comments:   Reason for Stopping:         doxycycline (VIBRAMYCIN) 100 MG Cap Comments:   Reason for Stopping:         Lactobacillus acidophilus (ACIDOPHILUS ORAL) Comments:   Reason for Stopping:         metoprolol succinate (TOPROL-XL) 100 MG 24 hr tablet Comments:   Reason for Stopping:             Discharge Procedure Orders   Diet diabetic     Diet Cardiac     Other restrictions (specify):   Order Comments: PLEASE OBSERVE FALL PRECAUTIONS     Call MD for:   Order Comments: For worsening symptoms, chest pain, shortness of breath, increased abdominal pain, high grade fever, stroke or stroke like symptoms, immediately go to the nearest Emergency Room or call 911 as soon as possible.     Follow-up Information     [facility].    Why:  care home  Contact information:  [address] [place] [id number] [phone]           [facility].    Specialties:  Hospice Services, Hospice and Palliative Medicine  Why:  Hospice  Contact information:  [address] [address] [phone]             Sotero Le MD.    Specialty:  General Practice  Why:  As needed  Contact information:  [address] [facility] [address] [phone]             Chema Lunsford MD.    Specialty:  Cardiology  Why:  As needed  Contact information:  [address] [facility] [id number] [phone]

## 2018-09-25 NOTE — PLAN OF CARE
09/25/18 0737   Final Note   Assessment Type Final Discharge Note   Discharge Disposition [facility]